# Patient Record
Sex: MALE | Race: WHITE | NOT HISPANIC OR LATINO | Employment: OTHER | URBAN - METROPOLITAN AREA
[De-identification: names, ages, dates, MRNs, and addresses within clinical notes are randomized per-mention and may not be internally consistent; named-entity substitution may affect disease eponyms.]

---

## 2017-06-02 ENCOUNTER — TELEPHONE (OUTPATIENT)
Dept: FAMILY MEDICINE | Facility: CLINIC | Age: 62
End: 2017-06-02

## 2017-06-02 LAB
BILIRUB SERPL-MCNC: 1.7 MG/DL (ref 0.3–1)
BILIRUBIN DIRECT+TOT PNL SERPL-MCNC: 0.2 MG/DL (ref 0–0.2)
GLUCOSE: 116 MG/DL (ref 70–99)
HIV 1/2 EIA AB SCREEN: NEGATIVE

## 2017-06-02 NOTE — TELEPHONE ENCOUNTER
----- Message from Sherlyn Cardenas MD sent at 6/2/2017 11:27 AM CDT -----  Notify pt : abnormal result, please make an appointment with me to review in two weeks or next appoitmebnt. Thanks    Sherlyn CARDENAS MD

## 2017-06-02 NOTE — PROGRESS NOTES
Notify pt : abnormal result, please make an appointment with me to review in two weeks or next appoitmebnt. Thanks    Sherlyn MANE MD

## 2017-06-05 NOTE — PROGRESS NOTES
Please notify pt : the result is acceptable.There are no significant abnormalities that are identified.  Please dont hesitate contact us if having any questions.  Or It will be reviewed in detail in the next visit.    Thanks . Sherlyn MANE MD

## 2017-06-28 RX ORDER — LISINOPRIL 10 MG/1
1 TABLET ORAL DAILY
Refills: 2 | COMMUNITY
Start: 2017-05-22 | End: 2017-06-29 | Stop reason: SDUPTHER

## 2017-06-29 ENCOUNTER — OFFICE VISIT (OUTPATIENT)
Dept: FAMILY MEDICINE | Facility: CLINIC | Age: 62
End: 2017-06-29
Payer: COMMERCIAL

## 2017-06-29 VITALS
OXYGEN SATURATION: 96 % | SYSTOLIC BLOOD PRESSURE: 124 MMHG | BODY MASS INDEX: 32.62 KG/M2 | HEART RATE: 87 BPM | DIASTOLIC BLOOD PRESSURE: 80 MMHG | HEIGHT: 71 IN | WEIGHT: 233 LBS

## 2017-06-29 DIAGNOSIS — I10 ESSENTIAL HYPERTENSION: ICD-10-CM

## 2017-06-29 DIAGNOSIS — E78.5 HYPERLIPIDEMIA, MILD: ICD-10-CM

## 2017-06-29 DIAGNOSIS — R73.03 PREDIABETES: Primary | ICD-10-CM

## 2017-06-29 DIAGNOSIS — R74.8 ELEVATED LIVER ENZYMES: ICD-10-CM

## 2017-06-29 DIAGNOSIS — K76.0 FATTY LIVER: ICD-10-CM

## 2017-06-29 PROCEDURE — 99203 OFFICE O/P NEW LOW 30 MIN: CPT | Mod: ,,, | Performed by: FAMILY MEDICINE

## 2017-06-29 RX ORDER — LISINOPRIL 10 MG/1
10 TABLET ORAL DAILY
Qty: 90 TABLET | Refills: 2 | Status: SHIPPED | OUTPATIENT
Start: 2017-06-29 | End: 2017-12-19 | Stop reason: SDUPTHER

## 2017-06-29 NOTE — PROGRESS NOTES
Subjective:       Patient ID:  Pierce Kim is a 61 y.o. male with multiple medical diagnoses as listed in the medical history and problem list that presents for Follow-up (labs) and Hypertension  .      Chief Complaint: Follow-up (labs) and Hypertension    Hypertension   This is a chronic problem. The current episode started more than 1 year ago. The problem is unchanged. The problem is controlled. Pertinent negatives include no anxiety, blurred vision, chest pain, headaches, malaise/fatigue, palpitations, peripheral edema, PND or shortness of breath. There are no associated agents to hypertension. Risk factors for coronary artery disease include dyslipidemia. Past treatments include ACE inhibitors. The current treatment provides mild improvement. There are no compliance problems.  There is no history of angina, CVA, heart failure or PVD. There is no history of chronic renal disease.     Review of Systems   Constitutional: Negative for activity change, appetite change, chills, fatigue, fever and malaise/fatigue.   HENT: Negative for congestion, ear discharge, ear pain, hearing loss and sore throat.    Eyes: Negative for blurred vision, discharge, redness and itching.   Respiratory: Negative for apnea, chest tightness, shortness of breath and wheezing.    Cardiovascular: Negative for chest pain, palpitations, leg swelling and PND.   Gastrointestinal: Negative for abdominal distention and abdominal pain.   Endocrine: Negative for cold intolerance and polydipsia.   Genitourinary: Negative for dysuria.   Musculoskeletal: Negative for arthralgias and gait problem.   Skin: Negative for color change, pallor and rash.   Neurological: Negative for dizziness and headaches.   Hematological: Negative for adenopathy.   Psychiatric/Behavioral: Negative for agitation.       Past Medical History:   Diagnosis Date    Hyperlipidemia     Hypertension      No past surgical history on file.  Family History   Problem Relation Age  of Onset    Hypertension Mother     Emphysema Father     Cancer Father      Prostate & Esophageal CA     Social History     Social History    Marital status:      Spouse name: N/A    Number of children: N/A    Years of education: N/A     Social History Main Topics    Smoking status: Never Smoker    Smokeless tobacco: Never Used    Alcohol use Yes      Comment: Moderate    Drug use: No    Sexual activity: Not Asked     Other Topics Concern    None     Social History Narrative    None     Current Outpatient Prescriptions   Medication Sig Dispense Refill    ASPIRIN (ASPIR-81 ORAL) Take 1 tablet by mouth Daily.      lisinopril 10 MG tablet Take 1 tablet (10 mg total) by mouth Daily. 90 tablet 2    multivitamin capsule Take 1 capsule by mouth once daily.       No current facility-administered medications for this visit.      Review of patient's allergies indicates:  No Known Allergies  Objective:      Vitals:    06/29/17 1002   BP: 124/80   Pulse: 87     Physical Exam   Constitutional: He appears well-developed and well-nourished.   HENT:   Head: Normocephalic.   Eyes: Pupils are equal, round, and reactive to light. Right eye exhibits no discharge. Left eye exhibits no discharge. No scleral icterus.   Neck: No thyromegaly present.   Cardiovascular: Normal rate and regular rhythm.  Exam reveals no gallop and no friction rub.    No murmur heard.  Pulmonary/Chest: No respiratory distress. He has no wheezes. He has no rales. He exhibits no tenderness.   Abdominal: He exhibits no distension. There is no tenderness.   Musculoskeletal: He exhibits no tenderness.   Skin: No erythema.   Psychiatric: He has a normal mood and affect.       Assessment:       1. Prediabetes    2. Fatty liver    3. Elevated liver enzymes    4. Hyperlipidemia, mild    5. Essential hypertension        Plan:       Prediabetes  Comments:  , 117 in 6/2017. Ha1c 5.4 ( false negative) 2/2 BUN high.  consider meftormin if FG > 110  in 6 months.     Fatty liver  Weight reduction, Restcit alcohol intake, maximize glucose control, lipid reduction, control blood pressure and supplements were reviewed with pt. Pt voices understanding.     Elevated liver enzymes  Comments:  4/2017: fatty liver.  ALt 34. Bilirubin 1.7.    6/2017: total bilirubin 1.6 m direct bilirubin 0.2, negative on HIV/ hep panel.     Hyperlipidemia, mild  Comments:  4/2017: total 196, HDL 45,  TG 88. no statin needed.     Essential hypertension  Comments:  on ACEI .   Orders:  -     lisinopril 10 MG tablet; Take 1 tablet (10 mg total) by mouth Daily.  Dispense: 90 tablet; Refill: 2      Return in about 6 months (around 12/29/2017) for HTN and pre-DM , HLD with .      6/29/2017 Sherlyn Cardenas M.D.

## 2017-06-29 NOTE — PATIENT INSTRUCTIONS
Diet: Diabetes  Food is an important tool that you can use to control diabetes and stay healthy. Eating well-balanced meals in the correct amounts will help you control your blood glucose levels and prevent low blood sugar reactions. It will also help you reduce the health risks of diabetes. There is no one specific diet that is right for everyone with diabetes. But there are general guidelines to follow. A registered dietitian (RD) will create a tailored diet approach thats just right for you. He or she will also help you plan healthy meals and snacks. If you have any questions, call your dietitian for advice.    Guidelines for success  Talk with your healthcare provider before starting a diabetes diet or weight loss program. If you haven't talked with a dietitian yet, ask your provider for a referral. The following guidelines can help you succeed:  · Select foods from the 6 food groups below. Your dietitian will help you find food choices within each group. He or she will also show you serving sizes and how many servings you can have at each meal.  ¨ Grains, beans, and starchy vegetables  ¨ Vegetables  ¨ Fruit  ¨ Milk or yogurt  ¨ Meat, poultry, fish, or tofu  ¨ Healthy fats  · Check your blood sugar levels as directed by your provider. Take any medicine as prescribed by your provider.  · Learn to read food labels and pick the right portion sizes.  · Eat only the amount of food in your meal plan. Eat about the same amount of food at regular times each day. Dont skip meals. Eat meals 4 to 5 hours apart, with snacks in between.  · Limit alcohol. It raises blood sugar levels. Drink water or calorie-free diet drinks that use safe sweeteners.  · Eat less fat to help lower your risk of heart disease. Use nonfat or low-fat dairy products and lean meats. Avoid fried foods. Use cooking oils that are unsaturated, such as olive, canola, or peanut oil.  · Talk with your dietitian about safe sugar substitutes.  · Avoid  added salt. It can contribute to high blood pressure, which can cause heart disease. People with diabetes already have a risk of high blood pressure and heart disease.  · Stay at a healthy weight. If you need to lose weight, cut down on your portion sizes. But dont skip meals. Exercise is an important part of any weight management program. Talk with your provider about an exercise program thats right for you.  · For more information about the best diet plan for you, talk with a registered dietitian (RD). To find an RD in your area, contact:  ¨ Academy of Nutrition and Dietetics www.eatright.org  ¨ The American Diabetes Association 853-515-9333 www.diabetes.org  Date Last Reviewed: 8/1/2016 © 2000-2016 [employer]. [address] [place] [address], [place] [address]. All rights reserved. This information is not intended as a substitute for professional medical care. Always follow your healthcare professional's instructions.

## 2017-12-19 ENCOUNTER — OFFICE VISIT (OUTPATIENT)
Dept: FAMILY MEDICINE | Facility: CLINIC | Age: 62
End: 2017-12-19
Payer: COMMERCIAL

## 2017-12-19 VITALS
HEART RATE: 85 BPM | SYSTOLIC BLOOD PRESSURE: 138 MMHG | DIASTOLIC BLOOD PRESSURE: 77 MMHG | BODY MASS INDEX: 33.32 KG/M2 | WEIGHT: 238 LBS | HEIGHT: 71 IN

## 2017-12-19 DIAGNOSIS — I10 BENIGN ESSENTIAL HTN: ICD-10-CM

## 2017-12-19 PROCEDURE — 99213 OFFICE O/P EST LOW 20 MIN: CPT | Mod: ,,, | Performed by: INTERNAL MEDICINE

## 2017-12-19 RX ORDER — LISINOPRIL 10 MG/1
10 TABLET ORAL DAILY
Qty: 90 TABLET | Refills: 2 | Status: SHIPPED | OUTPATIENT
Start: 2017-12-19 | End: 2018-09-11 | Stop reason: SDUPTHER

## 2017-12-19 NOTE — PROGRESS NOTES
Subjective:       Patient ID: Pierce Kim is a 62 y.o. male.    Chief Complaint: Hypertension    Hypertension   This is a new problem. The current episode started more than 1 month ago. Pertinent negatives include no anxiety, chest pain, malaise/fatigue, neck pain, orthopnea, palpitations or shortness of breath. There are no associated agents to hypertension. Past treatments include ACE inhibitors. There is no history of angina, kidney disease, CVA, heart failure, left ventricular hypertrophy or renovascular disease. There is no history of hypercortisolism, a hypertension causing med or sleep apnea.       Past Medical History:   Diagnosis Date    Allergy     Hyperlipidemia     Hypertension      Social History     Social History    Marital status:      Spouse name: N/A    Number of children: N/A    Years of education: N/A     Occupational History    Retired       [employer]     Social History Main Topics    Smoking status: Never Smoker    Smokeless tobacco: Never Used    Alcohol use Yes      Comment: Moderate    Drug use: No    Sexual activity: Not on file     Other Topics Concern    Not on file     Social History Narrative    No narrative on file     History reviewed. No pertinent surgical history.  Family History   Problem Relation Age of Onset    Hypertension Mother     Emphysema Father     Cancer Father      Prostate & Esophageal CA       Review of Systems   Constitutional: Negative for activity change, appetite change, fatigue, malaise/fatigue and unexpected weight change.   HENT: Negative for congestion, sneezing and trouble swallowing.    Eyes: Negative for pain and visual disturbance.   Respiratory: Negative for cough, chest tightness and shortness of breath.    Cardiovascular: Negative for chest pain, palpitations, orthopnea and leg swelling.        Hypertension   Gastrointestinal: Negative for abdominal distention, abdominal pain, blood in stool, constipation and  "diarrhea.   Endocrine: Negative for cold intolerance, heat intolerance, polydipsia, polyphagia and polyuria.   Genitourinary: Positive for frequency. Negative for dysuria, hematuria and scrotal swelling.        Occasional nocturia ×2 at nighttime   Musculoskeletal: Negative for arthralgias, back pain, gait problem and neck pain.   Skin: Negative for pallor, rash and wound.        Slightly dry skin in the inferior extremities   Allergic/Immunologic: Negative for environmental allergies, food allergies and immunocompromised state.   Neurological: Negative for dizziness, seizures, speech difficulty, light-headedness and numbness.   Hematological: Negative for adenopathy. Does not bruise/bleed easily.   Psychiatric/Behavioral: Negative for agitation, behavioral problems and confusion. The patient is not nervous/anxious.        Objective:       Vitals:    12/19/17 0918   BP: 138/77   Pulse: 85   Weight: 108 kg (238 lb)   Height: 5' 11" (1.803 m)     Physical Exam   Constitutional: He is oriented to person, place, and time. He appears well-developed.   HENT:   Head: Normocephalic and atraumatic.   Nose: Nose normal.   Mouth/Throat: Oropharynx is clear and moist. No oropharyngeal exudate.   Eyes: Conjunctivae and EOM are normal.   Neck: Normal range of motion. Neck supple. No JVD present. No tracheal deviation present. No thyromegaly present.   Cardiovascular: Normal rate, regular rhythm and normal heart sounds.  Exam reveals no gallop and no friction rub.    No murmur heard.  Pulmonary/Chest: Effort normal and breath sounds normal. No respiratory distress. He has no wheezes. He has no rales.   Abdominal: Soft. Bowel sounds are normal. He exhibits no distension. There is no tenderness.   Musculoskeletal: Normal range of motion.   Lymphadenopathy:     He has no cervical adenopathy.   Neurological: He is alert and oriented to person, place, and time. He has normal reflexes.   Skin: Skin is warm and dry.   Dry skin "   Psychiatric: He has a normal mood and affect.   Nursing note and vitals reviewed.      Assessment:       1. Benign essential HTN         Plan:           Benign essential HTN  -     lisinopril 10 MG tablet; Take 1 tablet (10 mg total) by mouth Daily.  Dispense: 90 tablet; Refill: 2    It has been a pleasure encouraged seeing Mr. Kim today for follow-up.    He has recently been diagnosed with blood pressure and it seems to be stable with lisinopril. On suddenly getting up, on few occasions he feels a little lightheaded which wears off rapidly and fast.      He is enjoying his life currently with his children in [facility]. He has moved from [facility].He Is a retired .    While his blood pressures are fairly stable, I did review his labs in past and his LDL cholesterol was slightly elevated. His blood sugars fasting were also slightly elevated.    He has put on some weight when he is settled in  [place] for the last couple of years. I have set  him a target for 225 pounds over the next 6 months.    This will definitely help mitigate some of the blood pressure naturally and will lower his blood glucose also. When he comes back for follow-up I'll check a lipid panel and blood sugar at that time.    Other preventive care issues will also be subsequently addressed including shingles vaccine.    Patient does not smoke cigarettes. He does consume couple of alcoholic drinks on occasions and on some occasions he may consume  upto 4. I will advise him to generally stay with the limits of 2 drinks on  most of the occasions except if it is a special occasion and [employer] win.

## 2017-12-19 NOTE — PATIENT INSTRUCTIONS
Controlling High Blood Pressure  High blood pressure (hypertension) is often called the silent killer. This is because many people who have it dont know it. High blood pressure is defined as 140/90 mm Hg or higher. Know your blood pressure and remember to check it regularly. Doing so can save your life. Here are some things you can do to help control your blood pressure.    Choose heart-healthy foods  · Select low-salt, low-fat foods. Limit sodium intake to 2,400 mg per day or the amount suggested by your healthcare provider.  · Limit canned, dried, cured, packaged, and fast foods. These can contain a lot of salt.  · Eat 8 to 10 servings of fruits and vegetables every day.  · Choose lean meats, fish, or chicken.  · Eat whole-grain pasta, brown rice, and beans.  · Eat 2 to 3 servings of low-fat or fat-free dairy products.  · Ask your doctor about the DASH eating plan. This plan helps reduce blood pressure.  · When you go to a restaurant, ask that your meal be prepared with no added salt.  Maintain a healthy weight  · Ask your healthcare provider how many calories to eat a day. Then stick to that number.  · Ask your healthcare provider what weight range is healthiest for you. If you are overweight, a weight loss of only 3% to 5% of your body weight can help lower blood pressure. Generally, a good weight loss goal is to lose 10% of your body weight in a year.  · Limit snacks and sweets.  · Get regular exercise.  Get up and get active  · Choose activities you enjoy. Find ones you can do with friends or family. This includes bicycling, dancing, walking, and jogging.  · Park farther away from building entrances.  · Use stairs instead of the elevator.  · When you can, walk or bike instead of driving.  · [place] leaves, garden, or do household repairs.  · Be active at a moderate to vigorous level of physical activity for at least 40 minutes for a minimum of 3 to 4 days a week.   Manage stress  · Make time to relax and enjoy  life. Find time to laugh.  · Communicate your concerns with your loved ones and your healthcare provider.  · Visit with family and friends, and keep up with hobbies.  Limit alcohol and quit smoking  · Men should have no more than 2 drinks per day.  · Women should have no more than 1 drink per day.  · Talk with your healthcare provider about quitting smoking. Smoking significantly increases your risk for heart disease and stroke. Ask your healthcare provider about community smoking cessation programs and other options.  Medicines  If lifestyle changes arent enough, your healthcare provider may prescribe high blood pressure medicine. Take all medicines as prescribed. If you have any questions about your medicines, ask your healthcare provider before stopping or changing them.   Date Last Reviewed: 4/27/2016  © 3515-2226 [employer]. [address] [place] [address], [place] [address]. All rights reserved. This information is not intended as a substitute for professional medical care. Always follow your healthcare professional's instructions.

## 2018-07-09 ENCOUNTER — OFFICE VISIT (OUTPATIENT)
Dept: FAMILY MEDICINE | Facility: CLINIC | Age: 63
End: 2018-07-09
Payer: MEDICARE

## 2018-07-09 VITALS
SYSTOLIC BLOOD PRESSURE: 120 MMHG | HEIGHT: 71 IN | BODY MASS INDEX: 33.18 KG/M2 | HEART RATE: 82 BPM | WEIGHT: 237 LBS | DIASTOLIC BLOOD PRESSURE: 64 MMHG

## 2018-07-09 DIAGNOSIS — R06.83 SNORING: ICD-10-CM

## 2018-07-09 DIAGNOSIS — I10 BENIGN ESSENTIAL HTN: Primary | ICD-10-CM

## 2018-07-09 DIAGNOSIS — R53.83 FATIGUE, UNSPECIFIED TYPE: ICD-10-CM

## 2018-07-09 PROCEDURE — 99214 OFFICE O/P EST MOD 30 MIN: CPT | Mod: ,,, | Performed by: INTERNAL MEDICINE

## 2018-07-09 PROCEDURE — 3074F SYST BP LT 130 MM HG: CPT | Mod: ,,, | Performed by: INTERNAL MEDICINE

## 2018-07-09 PROCEDURE — 3008F BODY MASS INDEX DOCD: CPT | Mod: ,,, | Performed by: INTERNAL MEDICINE

## 2018-07-09 PROCEDURE — 3078F DIAST BP <80 MM HG: CPT | Mod: ,,, | Performed by: INTERNAL MEDICINE

## 2018-07-09 NOTE — PATIENT INSTRUCTIONS
Identifying Your Heart Risks  What are your risk factors?  A risk factor increases your chance of having heart disease. Some risk factors cant be controlled, such as age or family history of heart disease. But most others can be managed by making lifestyle changes and taking medicine. For each risk factor you reduce, your chance of heart attack and stroke goes down. And, the length and quality of life may go up.  You can make changes to manage the following risk factors.  Abnormal cholesterol levels  Abnormal levels of cholesterol can increase your risk of developing atherosclerotic cardiovascular disease (ASCVD), which can lead to a heart, stroke, or other problems. If your cholesterol levels are of concern, your healthcare provider will work with you to improve your cholesterol level. Lifestyle changes such as diet, exercise, and weight management can help improve your cholesterol level, but you may also need medicine.    High blood pressure  High blood pressure (hypertension) occurs when blood pushes too hard against artery walls as it flows through them. This damages the artery lining. In general, youre at risk if you have:  · Blood pressure of 120/80 or higher. Your doctor may prescribe a personal goal.  · Blood pressure of 140/90 is high blood pressure.  Smoking  This is the most important risk factor you can change. Smoking damages arteries and makes it easier for plaque to build up. Smokers are also at higher risk for blood clots (which can block arteries) and stroke. Youre at risk if you use any kind of tobacco or nicotine. This means:  · Cigarettes  · E-cigarettes  · Chew tobacco  · Cigars  · Pipe  Diabetes  This health problem leads to a high level of sugar in your blood. It can damage the arteries if not kept under control. Diabetes makes you more likely to have a silent heart attack (one without symptoms). Youre at risk if:  · Your A1C is between 5.7 and 6.4. Once it reaches 6.5, you have  diabetes.  Your healthcare provider will help you figure out what your A1C should be. Your target number will depend on your age, general health, and other factors. Your treatment plan may need changes if your current number is too high.  Excess weight  Being overweight makes other risk factors, such as high blood pressure and diabetes, more likely. Excess weight around the waist or stomach increases your heart disease risk the most. Youre at risk if your:  · Waist circumference is more than 35 inches (women) or 40 inches (men).  · Body mass index (BMI) is greater than 25.  Lack of physical activity  If youre not active, problems with diabetes, blood pressure, cholesterol, and weight are more likely. Youre at risk if:  · You exercise less than 40 minutes per day, on fewer than 3 to 4 days a week.  Stress and strong emotions  Stressful events and feelings can raise heart rate and blood pressure. Stress can also bring on feelings of depression, anxiety, and anger. These feelings do not directly lead to heart disease, but they do affect overall health and make quality of life worse.  Date Last Reviewed: 7/1/2016  © 8267-2107 [employer]. [address] [place] [address]. All rights reserved. This information is not intended as a substitute for professional medical care. Always follow your healthcare professional's instructions.

## 2018-07-09 NOTE — PROGRESS NOTES
Subjective:       Patient ID: Pierce Kim is a 62 y.o. male.    Chief Complaint: Hypertension    Mr. Pierce Kim is a pleasant 62-year-old gentleman who comes for follow-up. His underlying hypertension. He is compliant with medications.    Generally his blood pressures under control.    He does admit to some snoring at night time and perhaps louder as per his wife. He does go to the bathroom couple of times at night. This does not seem to bother him overall.    He is a retired  and enjoying his postretirement life with his wife and family members.    No chest pains or shortness of breath. He did have a colonoscopy in 2017 and was found to have multiple polyps and has been recommended surveillance follow-up in 3-5 years.    No significant prostate symptoms.      Hypertension   This is a chronic problem. The current episode started more than 1 year ago. The problem is controlled. Pertinent negatives include no chest pain, malaise/fatigue, neck pain, palpitations or shortness of breath. There are no associated agents to hypertension. Risk factors for coronary artery disease include male gender and obesity. Past treatments include ACE inhibitors. There is no history of hyperaldosteronism, hypercortisolism, pheochromocytoma, renovascular disease or sleep apnea (snoring +).   Fatigue   This is a new problem. The current episode started more than 1 year ago. The problem occurs intermittently. The problem has been waxing and waning. Associated symptoms include fatigue. Pertinent negatives include no abdominal pain, arthralgias, chest pain, congestion, coughing, neck pain, numbness or rash.       Past Medical History:   Diagnosis Date    Allergy     Hyperlipidemia     Hypertension      Social History     Social History    Marital status:      Spouse name: N/A    Number of children: 3    Years of education: N/A     Occupational History    Retired [employer]  History Main Topics    Smoking status: Never Smoker    Smokeless tobacco: Never Used    Alcohol use Yes      Comment: Moderate    Drug use: No    Sexual activity: Yes     Partners: Female     Other Topics Concern    Not on file     Social History Narrative    No narrative on file     Past Surgical History:   Procedure Laterality Date    COLONOSCOPY  2017     Family History   Problem Relation Age of Onset    Hypertension Mother     Emphysema Father     Cancer Father         Prostate & Esophageal CA       Review of Systems   Constitutional: Positive for fatigue. Negative for activity change, appetite change, malaise/fatigue and unexpected weight change.   HENT: Negative for congestion, sneezing and trouble swallowing.    Eyes: Negative for pain and visual disturbance.   Respiratory: Negative for cough, chest tightness and shortness of breath.         Snoring but no formal diagnosis of sleep apnea.   Cardiovascular: Negative for chest pain, palpitations and leg swelling.        Hypertension   Gastrointestinal: Negative for abdominal distention, abdominal pain, blood in stool, constipation and diarrhea.   Endocrine: Negative for cold intolerance, heat intolerance, polydipsia, polyphagia and polyuria.   Genitourinary: Positive for frequency. Negative for dysuria, hematuria and scrotal swelling.        Occasional nocturia ×2 at nighttime   Musculoskeletal: Negative for arthralgias, back pain, gait problem and neck pain.   Skin: Negative for pallor, rash and wound.        Slightly dry skin in the inferior extremities   Allergic/Immunologic: Negative for environmental allergies, food allergies and immunocompromised state.   Neurological: Negative for dizziness, seizures, speech difficulty, light-headedness and numbness.   Hematological: Negative for adenopathy. Does not bruise/bleed easily.   Psychiatric/Behavioral: Negative for agitation, behavioral problems and confusion. The patient is not nervous/anxious.      "   Objective:       Vitals:    07/09/18 1431   BP: 120/64   Pulse: 82   Weight: 107.5 kg (237 lb)   Height: 5' 11" (1.803 m)     Physical Exam   Constitutional: He is oriented to person, place, and time. He appears well-developed.   HENT:   Head: Normocephalic and atraumatic.   Nose: Nose normal.   Mouth/Throat: Oropharynx is clear and moist. No oropharyngeal exudate.   Oral airways are fairly patent.   Eyes: Conjunctivae and EOM are normal.   Neck: Normal range of motion. Neck supple. No JVD present. No tracheal deviation present. No thyromegaly present.   Cardiovascular: Normal rate, regular rhythm and normal heart sounds.  Exam reveals no gallop and no friction rub.    No murmur heard.  Pulmonary/Chest: Effort normal and breath sounds normal. No respiratory distress. He has no wheezes. He has no rales.   Abdominal: Soft. Bowel sounds are normal. He exhibits no distension. There is no tenderness.   Musculoskeletal: Normal range of motion.   Lymphadenopathy:     He has no cervical adenopathy.   Neurological: He is alert and oriented to person, place, and time. He has normal reflexes.   Skin: Skin is warm and dry.   Dry skin   Psychiatric: He has a normal mood and affect.   Nursing note and vitals reviewed.      Component      Latest Ref Rng & Units 6/2/2017   Glucose      70 - 99 mg/dL 116 (H)   Total Bilirubin      0.3 - 1.0 mg/dL 1.7 (H)   Bilirubin, Direct      0.0 - 0.2 mg/dL 0.2   HIV 1/2 EIA Ab Screen      NEGATIVE NEGATIVE     Assessment:       1. Benign essential HTN    2. Snoring    3. Fatigue, unspecified type         Plan:           Benign essential HTN  -     Comprehensive metabolic panel; Future; Expected date: 07/09/2018  -     Microalbumin/creatinine urine ratio; Future; Expected date: 07/09/2018  -     Lipid panel; Future; Expected date: 07/09/2018    Snoring    Fatigue, unspecified type  -     Testosterone; Future; Expected date: 07/09/2018    Patient has been advised to watch diet and exercise. " Avoid fried and fatty food. Compliance to medications and follow up urged.    The patient is asked to make an attempt to improve diet and exercise patterns to aid in medical management of this problem.    Alcohol in moderation.

## 2018-09-11 DIAGNOSIS — I10 BENIGN ESSENTIAL HTN: ICD-10-CM

## 2018-09-11 RX ORDER — LISINOPRIL 10 MG/1
10 TABLET ORAL DAILY
Qty: 90 TABLET | Refills: 2 | Status: SHIPPED | OUTPATIENT
Start: 2018-09-11 | End: 2019-06-11 | Stop reason: SDUPTHER

## 2018-12-18 LAB
ALBUMIN SERPL-MCNC: 4.3 G/DL (ref 3.1–4.7)
ALP SERPL-CCNC: 43 IU/L (ref 40–104)
ALT (SGPT): 30 IU/L (ref 3–33)
AST SERPL-CCNC: 27 IU/L (ref 10–40)
BILIRUB SERPL-MCNC: 1.4 MG/DL (ref 0.3–1)
BUN SERPL-MCNC: 25 MG/DL (ref 8–20)
CALCIUM SERPL-MCNC: 9 MG/DL (ref 7.7–10.4)
CHLORIDE: 100 MMOL/L (ref 98–110)
CO2 SERPL-SCNC: 27.6 MMOL/L (ref 22.8–31.6)
CREATININE RANDOM URINE: 183 MG/DL
CREATININE: 1 MG/DL (ref 0.6–1.4)
GLUCOSE: 131 MG/DL (ref 70–99)
MICROALBUM.,U,RANDOM: 7.8 MCG/ML (ref 0–19.9)
MICROALBUMIN/CREATININE RATIO: 4 (ref 0–30)
POTASSIUM SERPL-SCNC: 4.1 MMOL/L (ref 3.5–5)
PROT SERPL-MCNC: 7.3 G/DL (ref 6–8.2)
SODIUM: 137 MMOL/L (ref 134–144)

## 2019-01-09 ENCOUNTER — OFFICE VISIT (OUTPATIENT)
Dept: FAMILY MEDICINE | Facility: CLINIC | Age: 64
End: 2019-01-09
Payer: MEDICARE

## 2019-01-09 VITALS
HEART RATE: 71 BPM | BODY MASS INDEX: 34.72 KG/M2 | SYSTOLIC BLOOD PRESSURE: 121 MMHG | HEIGHT: 71 IN | WEIGHT: 248 LBS | DIASTOLIC BLOOD PRESSURE: 87 MMHG

## 2019-01-09 DIAGNOSIS — E80.4 GILBERT SYNDROME: ICD-10-CM

## 2019-01-09 DIAGNOSIS — I10 BENIGN ESSENTIAL HTN: Primary | ICD-10-CM

## 2019-01-09 DIAGNOSIS — R73.03 PREDIABETES: ICD-10-CM

## 2019-01-09 PROCEDURE — 3008F BODY MASS INDEX DOCD: CPT | Mod: ,,, | Performed by: INTERNAL MEDICINE

## 2019-01-09 PROCEDURE — 99213 OFFICE O/P EST LOW 20 MIN: CPT | Mod: ,,, | Performed by: INTERNAL MEDICINE

## 2019-01-09 PROCEDURE — 3074F PR MOST RECENT SYSTOLIC BLOOD PRESSURE < 130 MM HG: ICD-10-PCS | Mod: ,,, | Performed by: INTERNAL MEDICINE

## 2019-01-09 PROCEDURE — 99213 PR OFFICE/OUTPT VISIT, EST, LEVL III, 20-29 MIN: ICD-10-PCS | Mod: ,,, | Performed by: INTERNAL MEDICINE

## 2019-01-09 PROCEDURE — 3074F SYST BP LT 130 MM HG: CPT | Mod: ,,, | Performed by: INTERNAL MEDICINE

## 2019-01-09 PROCEDURE — 3079F DIAST BP 80-89 MM HG: CPT | Mod: ,,, | Performed by: INTERNAL MEDICINE

## 2019-01-09 PROCEDURE — 3008F PR BODY MASS INDEX (BMI) DOCUMENTED: ICD-10-PCS | Mod: ,,, | Performed by: INTERNAL MEDICINE

## 2019-01-09 PROCEDURE — 3079F PR MOST RECENT DIASTOLIC BLOOD PRESSURE 80-89 MM HG: ICD-10-PCS | Mod: ,,, | Performed by: INTERNAL MEDICINE

## 2019-01-09 NOTE — PROGRESS NOTES
Subjective:       Patient ID: Pierce Kim is a 63 y.o. male.    Chief Complaint: Hypertension (lab review )    Hypertension   This is a chronic problem. The current episode started more than 1 year ago. The problem is controlled. Pertinent negatives include no chest pain, neck pain, palpitations or shortness of breath. Past treatments include ACE inhibitors. The current treatment provides moderate improvement. There is no history of hyperaldosteronism, hypercortisolism, pheochromocytoma or renovascular disease.       Past Medical History:   Diagnosis Date    Allergy     Hyperlipidemia     Hypertension      Social History     Socioeconomic History    Marital status:      Spouse name: Zoila    Number of children: 3    Years of education: Not on file    Highest education level: Not on file   Social Needs    Financial resource strain: Not on file    Food insecurity - worry: Not on file    Food insecurity - inability: Not on file    Transportation needs - medical: Not on file    Transportation needs - non-medical: Not on file   Occupational History    Occupation: Retired      Comment: [employer]   Tobacco Use    Smoking status: Never Smoker    Smokeless tobacco: Never Used   Substance and Sexual Activity    Alcohol use: Yes     Comment: Moderate    Drug use: No    Sexual activity: Yes     Partners: Female   Other Topics Concern    Not on file   Social History Narrative    Not on file     Past Surgical History:   Procedure Laterality Date    COLONOSCOPY  2017     Family History   Problem Relation Age of Onset    Hypertension Mother     Dementia Mother     Emphysema Father     Cancer Father         Prostate & Esophageal CA    Hypertension Sister        Review of Systems   Constitutional: Negative for activity change, appetite change, fatigue and unexpected weight change (gained 11 lbs).   HENT: Negative for congestion, sneezing and trouble swallowing.    Eyes: Negative  "for pain and visual disturbance.   Respiratory: Negative for cough, chest tightness and shortness of breath.         Snoring but no formal diagnosis of sleep apnea.   Cardiovascular: Negative for chest pain, palpitations and leg swelling.        Hypertension   Gastrointestinal: Negative for abdominal distention, abdominal pain, blood in stool, constipation and diarrhea.   Endocrine: Negative for cold intolerance, heat intolerance, polydipsia, polyphagia and polyuria.   Genitourinary: Negative for dysuria, frequency, hematuria and scrotal swelling.        Occasional nocturia ×2 at nighttime   Musculoskeletal: Negative for arthralgias, back pain, gait problem and neck pain.   Skin: Negative for pallor, rash and wound.        Slightly dry skin in the inferior extremities   Allergic/Immunologic: Negative for environmental allergies, food allergies and immunocompromised state.   Neurological: Negative for dizziness, seizures, speech difficulty, light-headedness and numbness.   Hematological: Negative for adenopathy. Does not bruise/bleed easily.   Psychiatric/Behavioral: Positive for sleep disturbance. Negative for agitation, behavioral problems and confusion. The patient is not nervous/anxious.          Objective:      Blood pressure 121/87, pulse 71, height 5' 11" (1.803 m), weight 112.5 kg (248 lb). Body mass index is 34.59 kg/m².  Physical Exam   Constitutional: He is oriented to person, place, and time. He appears well-developed.   HENT:   Head: Normocephalic and atraumatic.   Nose: Nose normal.   Mouth/Throat: Oropharynx is clear and moist. No oropharyngeal exudate.   Oral airways are fairly patent.   Eyes: Conjunctivae and EOM are normal.   Neck: Normal range of motion. Neck supple. No JVD present. No tracheal deviation present. No thyromegaly present.   Cardiovascular: Normal rate, regular rhythm and normal heart sounds. Exam reveals no gallop and no friction rub.   No murmur heard.  Pulmonary/Chest: Effort " normal and breath sounds normal. No respiratory distress. He has no wheezes. He has no rales.   Abdominal: Soft. Bowel sounds are normal. He exhibits no distension. There is no tenderness.   Musculoskeletal: Normal range of motion.   Lymphadenopathy:     He has no cervical adenopathy.   Neurological: He is alert and oriented to person, place, and time. He has normal reflexes.   Skin: Skin is warm and dry.   Dry skin   Psychiatric: He has a normal mood and affect.   Nursing note and vitals reviewed.        Assessment:       1. Benign essential HTN    2. Prediabetes    3. Gilbert syndrome           Orders Only on 12/18/2018   Component Date Value Ref Range Status    Microalbum.,U,Random 12/18/2018 7.8  0.0 - 19.9 mcg/ml Final    Creatinine, Random Ur 12/18/2018 183.00  mg/dl Final    Microalb Creat Ratio 12/18/2018 4  0 - 30 Final   Orders Only on 12/18/2018   Component Date Value Ref Range Status    Glucose 12/18/2018 131* 70 - 99 mg/dL Final    BUN, Bld 12/18/2018 25* 8 - 20 mg/dL Final    Creatinine 12/18/2018 1.00  0.60 - 1.40 mg/dL Final    Calcium 12/18/2018 9.0  7.7 - 10.4 mg/dL Final    Sodium 12/18/2018 137  134 - 144 mmol/L Final    Potassium 12/18/2018 4.1  3.5 - 5.0 mmol/L Final    Chloride 12/18/2018 100  98 - 110 mmol/L Final    CO2 12/18/2018 27.6  22.8 - 31.6 mmol/L Final    Albumin 12/18/2018 4.3  3.1 - 4.7 g/dL Final    Total Bilirubin 12/18/2018 1.4* 0.3 - 1.0 mg/dL Final    Alkaline Phosphatase 12/18/2018 43  40 - 104 IU/L Final    Total Protein 12/18/2018 7.3  6.0 - 8.2 g/dL Final    ALT (SGPT) 12/18/2018 30  3 - 33 IU/L Final    AST 12/18/2018 27  10 - 40 IU/L Final         Plan:           Benign essential HTN    Prediabetes    Gilbert syndrome       blood pressures are doing fairly good. Since the last visit, and over the holidays he is getting approximately 11 pounds of weight. His undoing seems to be the intermittent snacking besides the regular meals that he  has.    I did have dietary discussion especially given his slightly elevated blood sugars of 131 and slightly elevated LDL cholesterol.    Come this spring and summer season I would like him to lose about 10-15 pounds of weight before the next 6 months visit. At that time I will check his blood sugar and hemoglobin A1c again. See how he does. In the meantime he'll continue with lisinopril and aspirin.          Current Outpatient Medications:     ASPIRIN (ASPIR-81 ORAL), Take 1 tablet by mouth Daily., Disp: , Rfl:     lisinopril 10 MG tablet, Take 1 tablet (10 mg total) by mouth Daily., Disp: 90 tablet, Rfl: 2    multivitamin capsule, Take 1 capsule by mouth once daily., Disp: , Rfl:

## 2019-01-09 NOTE — PATIENT INSTRUCTIONS
Diabetes and Heart Disease     Take your medicines as directed each day, even if you feel fine.   If you have diabetes, you are two to four times more likely to have heart disease than someone without diabetes. This higher risk is due to diabetes, but it is also due to other risk factors for heart disease that happen in people with diabetes. But theres good news. You can help control your health risks by making some changes in your life. You can take steps to reduce your risk of heart disease by half--similar to the risk in people who don't have diabetes.  Your main risk factors  Three major risk factors for heart disease are high blood sugar, high blood pressure, and high levels of lipids. By keeping risk factors under control, you can help keep your heart and arteries healthy. This may reduce your chances of a heart attack.  · Blood sugar. High blood sugar can make artery walls tough and rough. Plaque (waxy material in the blood) can then build up along the artery walls, making it harder for blood to flow through the arteries. Having high blood sugar increases the chances of having high blood pressure and high cholesterol.  · Blood pressure. When blood pressure is high all the time it causes your heart to work harder to pump blood. Artery walls become damaged. This increases the risk for plaque build up.  · Lipids. The body needs some lipids in the blood to stay healthy. But lipid levels that are too high can damage the artery walls. Lipids include cholesterol and triglycerides. There are two kinds of cholesterol. LDL (bad) cholesterol can damage the arteries. But HDL (good) cholesterol helps clear LDL cholesterol from the blood vessels. This helps keep the arteries healthy. When blood sugar is high, the level of triglycerides in the blood may also be high. High blood triglyceride levels can cause plaque to form.   Other risk factors  Certain lifestyle factors can increase levels of your blood sugar, blood  pressure, and lipids. Such increases raise your risk of heart disease:  · Smoking damages the lining of your arteries. This allows plaque to build up in the artery walls. Smoking also constricts (narrows) the arteries. This can raise blood pressure and cause chest pain or angina. Smoking also increases your risk of getting type 2 diabetes.  · Not being active makes it harder for your heart to do its work. Inactivity is linked to many other risk factors, such as high blood pressure and poor cholesterol levels. Inactivity also increases your risk of getting type 2 diabetes.  · Being overweight makes it harder for your body to use insulin. It also makes your heart work too hard. Being overweight is also the main contributor to the development of type 2 diabetes,   Changes you can make  Following a few simple steps can help keep your risk factors under control. Work with your healthcare team to reach your goals.  · Quitting smoking could save your life. Smoking damages the lining of the blood vessels and raises blood pressure. Smoking also affects how your body uses insulin. This makes it harder to keep blood sugar under control. If you smoke and need help quitting, talk to your healthcare team.   · Testing your blood sugar is the only way to know whether it is under control. Be sure to test your blood sugar yourself. Also get your blood tested in the lab, as directed.  · Monitoring your blood pressure and lipid levels can help you achieve safe levels. Visit your healthcare team as scheduled.  · Taking medicines as directed can help control blood sugar, blood pressure, blood clotting, and/or cholesterol levels.  · Eating right can reduce your risk factors and help you lose weight. Try to limit the amount of processed or refined carbohydrates you eat at one time. Cut back on your total calorie intake. Eat foods low in saturated fat and cholesterol. Eat fiber, including vegetables and whole grains, and cut down on salt. A  dietitian or diabetes educator can help form a meal plan that works for you--even if you are on a low budget.   · Being active can help reduce your weight, strengthen your heart, and lower your lipid levels and blood pressure. Exercise and activity are good for your whole body. Talk to your healthcare team about increasing your activity safely over time.  · Keeping your appointments with your healthcare provider helps you stay healthy. Go in for checkups and lab tests as scheduled.  Date Last Reviewed: 5/19/2016 © 2000-2017 [employer]. [address] [place] [address], [place] [address]. All rights reserved. This information is not intended as a substitute for professional medical care. Always follow your healthcare professional's instructions.        Aerobic Exercise for a Healthy Heart  Exercise is a lot more than an energy booster and a stress reliever. It also strengthens your heart muscle, lowers your blood pressure and cholesterol, and burns calories. It can also improve your resting muscle tone, and your mood.     Remember, some activity is better than none.    Choose an aerobic activity  Choose an activity that makes your heart and lungs work harder than they do when you rest or walk normally. This aerobic exercise can improve the way your heart and other muscles use oxygen. Make it fun by exercising with a friend and choosing an activity you enjoy. Here are some ideas:  · Walking  · Swimming  · Bicycling  · Stair climbing  · Dancing  · Jogging  · Gardening  Exercise regularly  If you havent been exercising regularly,  get your doctors OK first. Then start slowly.  Here are some tips:  · Begin exercising 3 times a week for 5 to 10 minutes at a time.  · When you feel comfortable, add a few minutes each session.  · Slowly build up to exercising 3 to 4 times each week. Each session should last for 40 minutes, on average, and involve moderate- to vigorous-intensity physical activity.  · If you have been  given nitroglycerin, be sure to carry it when you exercise.  · If you get chest pain (angina) when youre exercising, stop what youre doing, take your nitroglycerin, and call your doctor.  Date Last Reviewed: 6/2/2016 © 2000-2017 [employer]. [address] [place] [address]. All rights reserved. This information is not intended as a substitute for professional medical care. Always follow your healthcare professional's instructions.        Established High Blood Pressure    High blood pressure (hypertension) is a chronic disease. Often, healthcare providers dont know what causes it. But it can be caused by certain health conditions and medicines.  If you have high blood pressure, you may not have any symptoms. If you do have symptoms, they may include headache, dizziness, changes in your vision, chest pain, and shortness of breath. But even without symptoms, high blood pressure thats not treated raises your risk for heart attack and stroke. High blood pressure is a serious health risk and shouldnt be ignored.  A blood pressure reading is made up of two numbers: a higher number over a lower number. The top number is the systolic pressure. The bottom number is the diastolic pressure. A normal blood pressure is a systolic pressure of  less than 120 over a diastolic pressure of less than 80. You will see your blood pressure readings written together. For example, a person with a systolic pressure of 188 and a diastolic pressure of 78 will have 118/78 written in the medical record.  High blood pressure is when either the top number is 140 or higher, or the bottom number is 90 or higher. This must be the result when taking your blood pressure a number of times. The blood pressures between normal and high are called prehypertension.  Home care  If you have high blood pressure, you should do what is listed below to lower your blood pressure. If you are taking medicines for high blood pressure,  these methods may reduce or end your need for medicines in the future.  · Begin a weight-loss program if you are overweight.  · Cut back on how much salt you get in your diet. Heres how to do this:  ¨ Dont eat foods that have a lot of salt. These include olives, pickles, smoked meats, and salted potato chips.  ¨ Dont add salt to your food at the table.  ¨ Use only small amounts of salt when cooking.  · Start an exercise program. Talk with your healthcare provider about the type of exercise program that would be best for you. It doesn't have to be hard. Even brisk walking for 20 minutes 3 times a week is a good form of exercise.  · Dont take medicines that stimulate the heart. This includes many over-the-counter cold and sinus decongestant pills and sprays, as well as diet pills. Check the warnings about hypertension on the label. Before buying any over-the-counter medicines or supplements, always ask the pharmacist about the product's potential interaction with your high blood pressure and your high blood pressure medicines.  · Stimulants such as amphetamine or cocaine could be deadly for someone with high blood pressure. Never take these.  · Limit how much caffeine you get in your diet. Switch to caffeine-free products.  · Stop smoking. If you are a long-time smoker, this can be hard. Talk to your healthcare provider about medicines and nicotine replacement options to help you. Also, enroll in a stop-smoking program to make it more likely that you will quit for good.  · Learn how to handle stress. This is an important part of any program to lower blood pressure. Learn about relaxation methods like meditation, yoga, or biofeedback.  · If your provider prescribed medicines, take them exactly as directed. Missing doses may cause your blood pressure get out of control.  · If you miss a dose or doses, check with your healthcare provider or pharmacist about what to do.  · Consider buying an automatic blood pressure  machine. Ask your provider for a recommendation. You can get one of these at most pharmacies.     The American Heart Association recommends the following guidelines for home blood pressure monitoring:  · Don't smoke or drink coffee for 30 minutes before taking your blood pressure.  · Go to the bathroom before the test.  · Relax for 5 minutes before taking the measurement.  · Sit with your back supported (don't sit on a couch or soft chair); keep your feet on the floor uncrossed. Place your arm on a solid flat surface (like a table) with the upper part of the arm at heart level. Place the middle of the cuff directly above the eye of the elbow. Check the monitor's instruction manual for an illustration.  · Take multiple readings. When you measure, take 2 to 3 readings one minute apart and record all of the results.  · Take your blood pressure at the same time every day, or as your healthcare provider recommends.  · Record the date, time, and blood pressure reading.  · Take the record with you to your next medical appointment. If your blood pressure monitor has a built-in memory, simply take the monitor with you to your next appointment.  · Call your provider if you have several high readings. Don't be frightened by a single high blood pressure reading, but if you get several high readings, check in with your healthcare provider.  · Note: When blood pressure reaches a systolic (top number) of 180 or higher OR diastolic (bottom number) of 110 or higher, seek emergency medical treatment.  Follow-up care  You will need to see your healthcare provider regularly. This is to check your blood pressure and to make changes to your medicines. Make a follow-up appointment as directed. Bring the record of your home blood pressure readings to the appointment.  When to seek medical advice  Call your healthcare provider right away if any of these occur:  · Blood pressure reaches a systolic (upper number) of 180 or higher OR a  diastolic (bottom number) of 110 or higher  · Chest pain or shortness of breath  · Severe headache  · Throbbing or rushing sound in the ears  · Nosebleed  · Sudden severe pain in your belly (abdomen)  · Extreme drowsiness, confusion, or fainting  · Dizziness or spinning sensation (vertigo)  · Weakness of an arm or leg or one side of the face  · You have problems speaking or seeing   Date Last Reviewed: 12/1/2016 © 2000-2017 [employer]. [address] [place] [address]. All rights reserved. This information is not intended as a substitute for professional medical care. Always follow your healthcare professional's instructions.        Eating Heart-Healthy Foods  Eating has a big impact on your heart health. In fact, eating healthier can improve several of your heart risks at once. For instance, it helps you manage weight, cholesterol, and blood pressure. Here are ideas to help you make heart-healthy changes without giving up all the foods and flavors you love.  Getting started  · Talk with your health care provider about eating plans, such as the DASH or Mediterranean diet. You may also be referred to a dietitian.  · Change a few things at a time. Give yourself time to get used to a few eating changes before adding more.  · Work to create a tasty, healthy eating plan that you can stick to for the rest of your life.    Goals for healthy eating  Below are some tips to improve your eating habits:  · Limit saturated fats and trans fats. Saturated fats raise your levels of cholesterol, so keep these fats to a minimum. They are found in foods such as fatty meats, whole milk, cheese, and palm and coconut oils. Avoid trans fats because they lower good cholesterol as well as raise bad cholesterol. Trans fats are most often found in processed foods.  · Reduce sodium (salt) intake. Eating too much salt may increase your blood pressure. Limit your sodium intake to 2,300 milligrams (mg) per day, or less if  your health care provider recommends it. Dining out less often and eating fewer processed foods are two great ways to decrease the amount of salt you consume.  · Managing calories. A calorie is a unit of energy. Your body burns calories for fuel, but if you eat more calories than your body burns, the extras are stored as fat. Your health care provider can help you create a diet plan to manage your calories. This will likely include eating healthier foods as well as exercising regularly. To help you track your progress, keep a diary to record what you eat and how often you exercise.  Choose the right foods  Aim to make these foods staples of your diet. If you have diabetes, you may have different recommendations than what is listed here:  · Fruits and vegetable provide plenty of nutrients without a lot of calories. At meals, fill half your plate with these foods. Split the other half of your plate between whole grains and lean protein.  · Whole grains are high in fiber and rich in vitamins and nutrients. Good choices include whole-wheat bread, pasta, and brown rice.  · Lean proteins give you nutrition with less fat. Good choices include fish, skinless chicken, and beans.  · Low-fat or nonfat dairy provides nutrients without a lot of fat. Try low-fat or nonfat milk, cheese, or yogurt.  · Healthy fats can be good for you in small amounts. These are unsaturated fats, such as olive oil, nuts, and fish. Try to have at least 2 servings per week of fatty fish such as salmon, sardines, mackerel, rainbow trout, and albacore tuna. These contain omega-3 fatty acids, which are good for your heart. Flaxseed is another source of a heart-healthy fat.  More on heart healthy eating    Read food labels  Healthy eating starts at the grocery store. Be sure to pay attention to food labels on packaged foods. Look for products that are high in fiber and protein, and low in saturated fat, cholesterol, and sodium. Avoid products that contain  trans fat. And pay close attention to serving size. For instance, if you plan to eat two servings, double all the numbers on the label.  Prepare food right  A key part of healthy cooking is cutting down on added fat and salt. Look on the internet for lower-fat, lower-sodium recipes. Also, try these tips:  · Remove fat from meat and skin from poultry before cooking.  · Skim fat from the surface of soups and sauces.  · Broil, boil, bake, steam, grill, and microwave food without added fats.  · Choose ingredients that spice up your food without adding calories, fat, or sodium. Try these items: horseradish, hot sauce, lemon, mustard, nonfat salad dressings, and vinegar. For salt-free herbs and spices, try basil, cilantro, cinnamon, pepper, and rosemary.  Date Last Reviewed: 6/25/2015  © 8574-6969 [employer]. [address] [place] [address]. All rights reserved. This information is not intended as a substitute for professional medical care. Always follow your healthcare professional's instructions.        Exercise for a Healthier Heart  You may wonder how you can improve the health of your heart. If youre thinking about exercise, youre on the right track. You dont need to become an athlete, but you do need a certain amount of brisk exercise to help strengthen your heart. If you have been diagnosed with a heart condition, your doctor may recommend exercise to help stabilize your condition. To help make exercise a habit, choose safe, fun activities.     Exercise with a friend. When activity is fun, you're more likely to stick with it.     Be sure to check with your healthcare provider before starting an exercise program.   Why exercise?  Exercising regularly offers many healthy rewards. It can help you do all of the following:  · Improve your blood cholesterol level to help prevent further heart trouble  · Lower your blood pressure to help prevent a stroke or heart attack  · Control diabetes, or  reduce your risk of getting this disease  · Improve your heart and lung function  · Reach and maintain a healthy weight  · Make your muscles stronger and more limber so you can stay active  · Prevent falls and fractures by slowing the loss of bone mass (osteoporosis)  · Manage stress better  · Reduce your blood pressure  · Improve your sense of self and your body image  Exercise tips  Ease into your routine. Set small goals. Then build on them.  Exercise on most days. Aim for a total of 150 or more minutes of moderate to  vigorous intensity activity each week. Consider 40 minutes, 3 to 4 times a week. For best results, activity should last for 40 minutes on average. It is OK to work up to the 40 minute period over time. Examples of moderate-intensity activity is walking 1 mile in 15 minutes or 30 to 45 minutes of yard work.  Step up your daily activity level. Along with your exercise program, try being more active throughout the day. Walk instead of drive. Do more household tasks or yard work.  Choose one or more activities you enjoy. Walking is one of the easiest things you can do. You can also try swimming, riding a bike, dancing, or taking an exercise class.  Stop exercising and call your doctor if you:  · Have chest pain or feel dizzy or lightheaded  · Feel burning, tightness, pressure, or heaviness in your chest, neck, shoulders, back, or arms  · Have unusual shortness of breath  · Have increased joint or muscle pain  · Have palpitations or an irregular heartbeat   Date Last Reviewed: 5/1/2016  © 1968-2890 [employer]. [address] [place] [address], [place] [address]. All rights reserved. This information is not intended as a substitute for professional medical care. Always follow your healthcare professional's instructions.        Aerobic Exercise for a Healthy Heart  Exercise is a lot more than an energy booster and a stress reliever. It also strengthens your heart muscle, lowers your blood pressure  and cholesterol, and burns calories. It can also improve your resting muscle tone, and your mood.     Remember, some activity is better than none.    Choose an aerobic activity  Choose an activity that makes your heart and lungs work harder than they do when you rest or walk normally. This aerobic exercise can improve the way your heart and other muscles use oxygen. Make it fun by exercising with a friend and choosing an activity you enjoy. Here are some ideas:  · Walking  · Swimming  · Bicycling  · Stair climbing  · Dancing  · Jogging  · Gardening  Exercise regularly  If you havent been exercising regularly,  get your doctors OK first. Then start slowly.  Here are some tips:  · Begin exercising 3 times a week for 5 to 10 minutes at a time.  · When you feel comfortable, add a few minutes each session.  · Slowly build up to exercising 3 to 4 times each week. Each session should last for 40 minutes, on average, and involve moderate- to vigorous-intensity physical activity.  · If you have been given nitroglycerin, be sure to carry it when you exercise.  · If you get chest pain (angina) when youre exercising, stop what youre doing, take your nitroglycerin, and call your doctor.  Date Last Reviewed: 6/2/2016  © 4716-8799 [employer]. [address] [place] [address], [place] [address]. All rights reserved. This information is not intended as a substitute for professional medical care. Always follow your healthcare professional's instructions.

## 2019-06-11 DIAGNOSIS — I10 BENIGN ESSENTIAL HTN: ICD-10-CM

## 2019-06-11 RX ORDER — LISINOPRIL 10 MG/1
TABLET ORAL
Qty: 30 TABLET | Refills: 1 | Status: SHIPPED | OUTPATIENT
Start: 2019-06-11 | End: 2019-08-16 | Stop reason: SDUPTHER

## 2019-07-12 LAB
BUN SERPL-MCNC: 34 MG/DL (ref 8–20)
CALCIUM SERPL-MCNC: 9.2 MG/DL (ref 7.7–10.4)
CHLORIDE: 104 MMOL/L (ref 98–110)
CO2 SERPL-SCNC: 27.1 MMOL/L (ref 22.8–31.6)
CREATININE: 1.04 MG/DL (ref 0.6–1.4)
GLUCOSE: 113 MG/DL (ref 70–99)
HBA1C MFR BLD: 5.3 % (ref 3.1–6.5)
POTASSIUM SERPL-SCNC: 4.4 MMOL/L (ref 3.5–5)
SODIUM: 137 MMOL/L (ref 134–144)

## 2019-07-15 ENCOUNTER — TELEPHONE (OUTPATIENT)
Dept: FAMILY MEDICINE | Facility: CLINIC | Age: 64
End: 2019-07-15

## 2019-07-15 NOTE — TELEPHONE ENCOUNTER
----- Message from Celestine Walsh MD sent at 7/13/2019  4:14 PM CDT -----  The results are within acceptable range.  Please keep regular follow up.

## 2019-08-16 DIAGNOSIS — I10 BENIGN ESSENTIAL HTN: ICD-10-CM

## 2019-08-16 RX ORDER — LISINOPRIL 10 MG/1
TABLET ORAL
Qty: 30 TABLET | Refills: 1 | Status: SHIPPED | OUTPATIENT
Start: 2019-08-16 | End: 2019-09-09 | Stop reason: SDUPTHER

## 2019-09-09 ENCOUNTER — OFFICE VISIT (OUTPATIENT)
Dept: FAMILY MEDICINE | Facility: CLINIC | Age: 64
End: 2019-09-09
Payer: COMMERCIAL

## 2019-09-09 VITALS
HEART RATE: 86 BPM | WEIGHT: 219 LBS | BODY MASS INDEX: 30.66 KG/M2 | DIASTOLIC BLOOD PRESSURE: 82 MMHG | SYSTOLIC BLOOD PRESSURE: 124 MMHG | HEIGHT: 71 IN

## 2019-09-09 DIAGNOSIS — Z12.5 SCREENING FOR PROSTATE CANCER: ICD-10-CM

## 2019-09-09 DIAGNOSIS — R73.03 PREDIABETES: ICD-10-CM

## 2019-09-09 DIAGNOSIS — I10 BENIGN ESSENTIAL HTN: Primary | ICD-10-CM

## 2019-09-09 PROCEDURE — 99213 PR OFFICE/OUTPT VISIT, EST, LEVL III, 20-29 MIN: ICD-10-PCS | Mod: S$GLB,,, | Performed by: INTERNAL MEDICINE

## 2019-09-09 PROCEDURE — 3008F PR BODY MASS INDEX (BMI) DOCUMENTED: ICD-10-PCS | Mod: S$GLB,,, | Performed by: INTERNAL MEDICINE

## 2019-09-09 PROCEDURE — 99213 OFFICE O/P EST LOW 20 MIN: CPT | Mod: S$GLB,,, | Performed by: INTERNAL MEDICINE

## 2019-09-09 PROCEDURE — 3074F PR MOST RECENT SYSTOLIC BLOOD PRESSURE < 130 MM HG: ICD-10-PCS | Mod: S$GLB,,, | Performed by: INTERNAL MEDICINE

## 2019-09-09 PROCEDURE — 99999 PR PBB SHADOW E&M-EST. PATIENT-LVL III: ICD-10-PCS | Mod: PBBFAC,,, | Performed by: INTERNAL MEDICINE

## 2019-09-09 PROCEDURE — 3074F SYST BP LT 130 MM HG: CPT | Mod: S$GLB,,, | Performed by: INTERNAL MEDICINE

## 2019-09-09 PROCEDURE — 3008F BODY MASS INDEX DOCD: CPT | Mod: S$GLB,,, | Performed by: INTERNAL MEDICINE

## 2019-09-09 PROCEDURE — 3079F PR MOST RECENT DIASTOLIC BLOOD PRESSURE 80-89 MM HG: ICD-10-PCS | Mod: S$GLB,,, | Performed by: INTERNAL MEDICINE

## 2019-09-09 PROCEDURE — 3079F DIAST BP 80-89 MM HG: CPT | Mod: S$GLB,,, | Performed by: INTERNAL MEDICINE

## 2019-09-09 PROCEDURE — 99999 PR PBB SHADOW E&M-EST. PATIENT-LVL III: CPT | Mod: PBBFAC,,, | Performed by: INTERNAL MEDICINE

## 2019-09-09 RX ORDER — LISINOPRIL 10 MG/1
10 TABLET ORAL DAILY
Qty: 90 TABLET | Refills: 2 | Status: SHIPPED | OUTPATIENT
Start: 2019-09-09 | End: 2020-06-02

## 2019-09-09 NOTE — PATIENT INSTRUCTIONS
Taking Your Blood Pressure  Blood pressure is the force of blood against the artery wall as it moves from the heart through the blood vessels. You can take your own blood pressure reading using a digital monitor. Take your readings the same each time, using the same arm. Take readings as often as your healthcare provider instructs.  About blood pressure monitors  Blood pressure monitors are designed for certain ages and cases. You can find monitors for older adults, for pregnant women, and for children. Make sure the one you choose is the right one for your age and situation.  The American Heart Association recommends an automatic cuff monitor that fits on your upper arm (bicep). The cuff should fit your arm size. A cuff thats too large or too small will not give an accurate reading. Measure around your upper arm to find your size.  Monitors that attach to your finger or wrist are not as accurate as monitors for your upper arm.  Ask your healthcare provider for help in choosing a monitor. Bring your monitor to your next provider visit if you need help in using it the correct way.  The steps below are general instructions for using an automatic digital monitor.  Step 1. Relax    · Take your blood pressure at the same time every day, such as in the morning or evening, or at the time your healthcare provider recommends.  · Wait at least a half-hour after smoking, eating, or exercising. Don't drink coffee, tea, soda, or other caffeinated beverages before checking your blood pressure.  · Sit comfortably at a table with both feet on the floor. Do not cross your legs or feet. Place the monitor near you.  · Rest for a few minutes before you begin.  Step 2. Wrap the cuff    · Place your arm on the table, palm up. Your arm should be at the level of your heart. Wrap the cuff around your upper arm, just above your elbow. Its best done on bare skin, not over clothing. Most cuffs will indicate where the brachial artery (the  blood vessel in the middle of the arm at the inner side of the elbow) should line up with the cuff. Look in your monitor's instruction booklet for an illustration. You can also bring your cuff to your healthcare provider and have them show you how to correctly place the cuff.  Step 3. Inflate the cuff    · Push the button that starts the pump.  · The cuff will tighten, then loosen.  · The numbers will change. When they stop changing, your blood pressure reading will appear.  · Take 2 or 3 readings one minute apart.  Step 4. Write down the results of each reading    · Write down your blood pressure numbers for each reading. Note the date and time. Keep your results in one place, such as a notebook. Even if your monitor has a built-in memory, keep a hard copy of the readings.  · Remove the cuff from your arm. Turn off the machine.  · Bring your blood pressure records with your healthcare providers at each visit.  · If you start a new blood pressure medicine, note the day you started the new medicine. Also note the day if you change the dose of your medicine. This information goes on your blood pressure recording sheet. This will help your healthcare provider monitor how well the medicine changes are working.  · Ask your healthcare provider what numbers should prompt you to call him or her. Also ask what numbers should prompt you to get help right away.  Date Last Reviewed: 11/1/2016  © 1684-7435 [employer]. [address] [place] [address], [place] [address]. All rights reserved. This information is not intended as a substitute for professional medical care. Always follow your healthcare professional's instructions.

## 2019-09-09 NOTE — PROGRESS NOTES
Subjective:       Patient ID: Pierce Kim is a 63 y.o. male.    Chief Complaint: Hypertension (lab review ) and Hyperlipidemia    Mr. Pierce Kim is a pleasant 63-year-old gentleman who comes for follow-up. His underlying hypertension. He is compliant with medications.  Through watching his diet and exercise he has lost approximately 28 lb of weight.  His blood sugar level has improved from 137-113.  His blood pressures are also under control at this point.    History of mild snoring and going to the bathroom couple of times at night have been noted but this does not seem to bother him.      He is a retired  and enjoying his postretirement life with his wife and family members.    No chest pains or shortness of breath. He did have a colonoscopy in 2017 and was found to have multiple polyps and has been recommended surveillance follow-up in 3-5 years.    No significant prostate symptoms.    Thus far he does not have any history of coronary artery disease or strokes.  No family history of coronary artery disease or strokes.  He is asking me about aspirin and I feel comfortable asking him to stop this medication hence forth.  No side effects experience from this medication in any case.    Hypertension   This is a chronic problem. The current episode started more than 1 year ago. The problem is unchanged. The problem is controlled. Pertinent negatives include no malaise/fatigue, palpitations or shortness of breath. There are no associated agents to hypertension. Risk factors for coronary artery disease include male gender and obesity. Past treatments include ACE inhibitors. The current treatment provides moderate improvement. There is no history of hyperaldosteronism, hypercortisolism, pheochromocytoma, renovascular disease or sleep apnea (snoring +).       Past Medical History:   Diagnosis Date    Allergy     Hyperlipidemia     Hypertension      Social History     Socioeconomic History     Marital status:      Spouse name: Zoila    Number of children: 3    Years of education: Not on file    Highest education level: Not on file   Occupational History    Occupation: Retired      Comment: [employer]   Social Needs    Financial resource strain: Not hard at all    Food insecurity:     Worry: Never true     Inability: Never true    Transportation needs:     Medical: No     Non-medical: No   Tobacco Use    Smoking status: Never Smoker    Smokeless tobacco: Never Used   Substance and Sexual Activity    Alcohol use: Yes     Frequency: 2-3 times a week     Drinks per session: 3 or 4     Binge frequency: Never     Comment: Moderate    Drug use: No    Sexual activity: Yes     Partners: Female   Lifestyle    Physical activity:     Days per week: 6 days     Minutes per session: 50 min    Stress: Not at all   Relationships    Social connections:     Talks on phone: More than three times a week     Gets together: Once a week     Attends Jewish service: Not on file     Active member of club or organization: Yes     Attends meetings of clubs or organizations: 1 to 4 times per year     Relationship status:    Other Topics Concern    Not on file   Social History Narrative    Not on file     Past Surgical History:   Procedure Laterality Date    COLONOSCOPY  2017     Family History   Problem Relation Age of Onset    Hypertension Mother     Dementia Mother     Emphysema Father     Cancer Father         Prostate & Esophageal CA    Hypertension Sister        Review of Systems   Constitutional: Negative for activity change, appetite change, malaise/fatigue and unexpected weight change (lost 28 lbs diet and exercise).   HENT: Negative for hearing loss, rhinorrhea, sneezing and trouble swallowing.    Eyes: Negative for pain, discharge and visual disturbance.   Respiratory: Negative for chest tightness, shortness of breath and wheezing.         Snoring but no formal  "diagnosis of sleep apnea.   Cardiovascular: Negative for palpitations and leg swelling.        Hypertension   Gastrointestinal: Negative for abdominal distention, blood in stool, constipation and diarrhea.   Endocrine: Negative for cold intolerance, heat intolerance, polydipsia, polyphagia and polyuria.   Genitourinary: Positive for frequency. Negative for difficulty urinating, dysuria, hematuria, scrotal swelling and urgency.        Occasional nocturia ×2 at nighttime   Musculoskeletal: Negative for back pain and gait problem.   Skin: Negative for pallor and wound.        Slightly dry skin in the inferior extremities   Allergic/Immunologic: Negative for environmental allergies, food allergies and immunocompromised state.   Neurological: Negative for dizziness, seizures, speech difficulty and light-headedness.   Hematological: Negative for adenopathy. Does not bruise/bleed easily.   Psychiatric/Behavioral: Negative for agitation, behavioral problems, confusion and dysphoric mood. The patient is not nervous/anxious.        Objective:       Vitals:    09/09/19 1020   BP: 124/82   Pulse: 86   Weight: 99.3 kg (219 lb)   Height: 5' 11" (1.803 m)     Physical Exam   Constitutional: He is oriented to person, place, and time. He appears well-developed.   HENT:   Head: Normocephalic and atraumatic.   Nose: Nose normal.   Mouth/Throat: Oropharynx is clear and moist. No oropharyngeal exudate.   Oral airways are fairly patent.   Eyes: Conjunctivae and EOM are normal. Right eye exhibits no discharge. Left eye exhibits no discharge. No scleral icterus.   Neck: Normal range of motion. Neck supple. No JVD present. No tracheal deviation present. No thyromegaly present.   Cardiovascular: Normal rate, regular rhythm and normal heart sounds. Exam reveals no gallop and no friction rub.   No murmur heard.  Pulmonary/Chest: Effort normal and breath sounds normal. No respiratory distress. He has no wheezes. He has no rales.   Abdominal: " Soft. Bowel sounds are normal. He exhibits no distension. There is no tenderness.   Musculoskeletal: Normal range of motion. He exhibits no tenderness or deformity.   Lymphadenopathy:     He has no cervical adenopathy.   Neurological: He is alert and oriented to person, place, and time. He has normal reflexes.   Skin: Skin is warm and dry.   Dry skin   Psychiatric: He has a normal mood and affect.   Nursing note and vitals reviewed.      Component      Latest Ref Rng & Units 6/2/2017   Glucose      70 - 99 mg/dL 116 (H)   Total Bilirubin      0.3 - 1.0 mg/dL 1.7 (H)   Bilirubin, Direct      0.0 - 0.2 mg/dL 0.2   HIV 1/2 EIA Ab Screen      NEGATIVE NEGATIVE     Assessment:       1. Benign essential HTN    2. Prediabetes    3. Screening for prostate cancer         Plan:           Benign essential HTN  -     lisinopril 10 MG tablet; Take 1 tablet (10 mg total) by mouth once daily.  Dispense: 90 tablet; Refill: 2  -     Basic metabolic panel; Future; Expected date: 03/02/2020    Prediabetes    Screening for prostate cancer  -     PSA, Screening; Future; Expected date: 03/02/2020     Patient has done well watching his diet and exercise and has managed to lose 28 lb of weight.  This will improve his health and overall general well-being significantly.    He does plan to lose another 8-9 lb of weight the next 6 months when he comes back for follow-up in 6 months for annual preventive physical.  If he does so, perhaps we may strongly consider discontinuing his blood pressure medication and if they are normal.    He will continue to watch his diet and exercise.    His blood sugar has improved from last time.    I am confident that next visit his blood sugar will be normalized and I will check a basic metabolic panel at that time and also a PSA.    Flu season starts now and will get the vaccinations in or office from October and he is welcome to stopped by our office to get a flu shot.    Fup 6 months

## 2020-03-04 ENCOUNTER — LAB VISIT (OUTPATIENT)
Dept: LAB | Facility: HOSPITAL | Age: 65
End: 2020-03-04
Attending: INTERNAL MEDICINE
Payer: COMMERCIAL

## 2020-03-04 DIAGNOSIS — I10 BENIGN ESSENTIAL HTN: ICD-10-CM

## 2020-03-04 DIAGNOSIS — Z12.5 SCREENING FOR PROSTATE CANCER: ICD-10-CM

## 2020-03-04 LAB
ANION GAP SERPL CALC-SCNC: 10 MMOL/L (ref 8–16)
BUN SERPL-MCNC: 28 MG/DL (ref 8–23)
CALCIUM SERPL-MCNC: 9.3 MG/DL (ref 8.7–10.5)
CHLORIDE SERPL-SCNC: 103 MMOL/L (ref 95–110)
CO2 SERPL-SCNC: 24 MMOL/L (ref 23–29)
COMPLEXED PSA SERPL-MCNC: 1.9 NG/ML (ref 0–4)
CREAT SERPL-MCNC: 1 MG/DL (ref 0.5–1.4)
EST. GFR  (AFRICAN AMERICAN): >60 ML/MIN/1.73 M^2
EST. GFR  (NON AFRICAN AMERICAN): >60 ML/MIN/1.73 M^2
GLUCOSE SERPL-MCNC: 119 MG/DL (ref 70–110)
POTASSIUM SERPL-SCNC: 4.6 MMOL/L (ref 3.5–5.1)
SODIUM SERPL-SCNC: 137 MMOL/L (ref 136–145)

## 2020-03-04 PROCEDURE — 84153 ASSAY OF PSA TOTAL: CPT

## 2020-03-04 PROCEDURE — 80048 BASIC METABOLIC PNL TOTAL CA: CPT

## 2020-03-04 PROCEDURE — 36415 COLL VENOUS BLD VENIPUNCTURE: CPT

## 2020-03-10 ENCOUNTER — OFFICE VISIT (OUTPATIENT)
Dept: FAMILY MEDICINE | Facility: CLINIC | Age: 65
End: 2020-03-10
Payer: COMMERCIAL

## 2020-03-10 VITALS
HEIGHT: 71 IN | RESPIRATION RATE: 16 BRPM | SYSTOLIC BLOOD PRESSURE: 122 MMHG | DIASTOLIC BLOOD PRESSURE: 80 MMHG | HEART RATE: 72 BPM | WEIGHT: 228 LBS | BODY MASS INDEX: 31.92 KG/M2

## 2020-03-10 DIAGNOSIS — Z23 NEED FOR SHINGLES VACCINE: ICD-10-CM

## 2020-03-10 DIAGNOSIS — Z00.00 ANNUAL PHYSICAL EXAM: Primary | ICD-10-CM

## 2020-03-10 PROCEDURE — 3074F PR MOST RECENT SYSTOLIC BLOOD PRESSURE < 130 MM HG: ICD-10-PCS | Mod: S$GLB,,, | Performed by: INTERNAL MEDICINE

## 2020-03-10 PROCEDURE — 3074F SYST BP LT 130 MM HG: CPT | Mod: S$GLB,,, | Performed by: INTERNAL MEDICINE

## 2020-03-10 PROCEDURE — 3079F DIAST BP 80-89 MM HG: CPT | Mod: S$GLB,,, | Performed by: INTERNAL MEDICINE

## 2020-03-10 PROCEDURE — 3079F PR MOST RECENT DIASTOLIC BLOOD PRESSURE 80-89 MM HG: ICD-10-PCS | Mod: S$GLB,,, | Performed by: INTERNAL MEDICINE

## 2020-03-10 PROCEDURE — 99396 PREV VISIT EST AGE 40-64: CPT | Mod: S$GLB,,, | Performed by: INTERNAL MEDICINE

## 2020-03-10 PROCEDURE — 99396 PR PREVENTIVE VISIT,EST,40-64: ICD-10-PCS | Mod: S$GLB,,, | Performed by: INTERNAL MEDICINE

## 2020-03-10 NOTE — PATIENT INSTRUCTIONS

## 2020-03-10 NOTE — PROGRESS NOTES
Subjective:       Patient ID: Pierce Kim is a 64 y.o. male.    Chief Complaint: Annual Exam (lab review )    Mr. Pierce Kim is a pleasant 64-year-old  gentleman who comes for annual checkup.  Thus far his medical issues include hypertension which is very well under control.  He has been very particular and careful about watching his diet and weight management.    As far as preventive care issues are concerned, he is mostly updated on majority of preventive care issues.  He did have a colonoscopy in 2017 and multiple polyps of benign nature with discovered.  He was recommended follow-up surveillance within 3-5 years.  This was done by Dr. Paulo Nicole.    He is also updated on tetanus vaccination and flu shots.  He does get a regular eye checkup.  He also gets dental checkup 3 times a year.    He is very particular and watchful on his diet and his wife Cooks healthy food.  They eat white needs like chicken and fish and avoid greasy and fatty food.    He and his wife go for walk at least 6 times a week.  Is a retired  and enjoying his life and stay in [place].  His origin only from [place].  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    Family history also has been reviewed.    The only thing which is pending at this point is shingles vaccine and we had a discussion about this.      Past Medical History:   Diagnosis Date    Hyperlipidemia     Hypertension      Social History     Socioeconomic History    Marital status:      Spouse name: Zoila    Number of children: 3    Years of education: Not on file    Highest education level: Not on file   Occupational History    Occupation: Retired      Comment: [employer]   Social Needs    Financial resource strain: Not hard at all    Food insecurity:     Worry: Never true     Inability: Never true    Transportation needs:     Medical: No     Non-medical: No   Tobacco Use    Smoking status: Never Smoker     Smokeless tobacco: Never Used   Substance and Sexual Activity    Alcohol use: Yes     Frequency: 2-3 times a week     Drinks per session: 3 or 4     Binge frequency: Never     Comment: occasional    Drug use: No    Sexual activity: Yes     Partners: Female   Lifestyle    Physical activity:     Days per week: 6 days     Minutes per session: 50 min    Stress: Not at all   Relationships    Social connections:     Talks on phone: More than three times a week     Gets together: Once a week     Attends [employer] service: Not on file     Active member of club or organization: Yes     Attends meetings of clubs or organizations: 1 to 4 times per year     Relationship status:    Other Topics Concern    Not on file   Social History Narrative    Not on file     Past Surgical History:   Procedure Laterality Date    COLONOSCOPY  2017     Family History   Problem Relation Age of Onset    Hypertension Mother     Dementia Mother     Emphysema Father     Cancer Father         Prostate & Esophageal CA    Hypertension Sister        Review of Systems   Constitutional: Negative for activity change, appetite change and unexpected weight change (lost 28 lbs diet and exercise. gained 9 lbs- Bob gras fun).   HENT: Negative for congestion, hearing loss, rhinorrhea, sneezing and trouble swallowing.    Eyes: Negative for pain, discharge and visual disturbance.   Respiratory: Negative for chest tightness, shortness of breath and wheezing.         Snoring but no formal diagnosis of sleep apnea.   Cardiovascular: Negative for palpitations and leg swelling.        Hypertension   Gastrointestinal: Negative for abdominal distention, blood in stool, constipation and diarrhea.   Endocrine: Negative for cold intolerance, heat intolerance, polydipsia, polyphagia and polyuria.   Genitourinary: Positive for frequency. Negative for difficulty urinating, dysuria, hematuria, scrotal swelling and urgency.        Occasional nocturia ×2 at  "nighttime Mostly once.    Musculoskeletal: Negative for back pain and gait problem.   Skin: Negative for pallor and wound.        Slightly dry skin in the inferior extremities   Allergic/Immunologic: Negative for environmental allergies, food allergies and immunocompromised state.   Neurological: Negative for dizziness, seizures, speech difficulty and light-headedness.   Hematological: Negative for adenopathy. Does not bruise/bleed easily.   Psychiatric/Behavioral: Negative for agitation, behavioral problems, confusion and dysphoric mood. The patient is not nervous/anxious.          Objective:      Blood pressure 122/80, pulse 72, resp. rate 16, height 5' 11" (1.803 m), weight 103.4 kg (228 lb). Body mass index is 31.8 kg/m².  Physical Exam   Constitutional: He is oriented to person, place, and time. He appears well-developed.   HENT:   Head: Normocephalic and atraumatic.   Nose: Nose normal.   Mouth/Throat: Oropharynx is clear and moist. No oropharyngeal exudate.   Oral airways are fairly patent.  Patient's hearing is intact to whispers at approximately 6 ft bilaterally.  His hearing is adequate with distraction also.   Eyes: Conjunctivae and EOM are normal. Right eye exhibits no discharge. Left eye exhibits no discharge. No scleral icterus.   Patient's vision is intact to 1 in letters at approximately 4-5 week bilaterally.  Color vision is normal to multiple colors.   Neck: Normal range of motion. Neck supple. No JVD present. No tracheal deviation present. No thyromegaly present.   There is no lymphadenopathy.   Cardiovascular: Normal rate, regular rhythm and normal heart sounds. Exam reveals no gallop and no friction rub.   No murmur heard.  Pulmonary/Chest: Effort normal and breath sounds normal. No respiratory distress. He has no wheezes. He has no rales.   Abdominal: Soft. Bowel sounds are normal. He exhibits no distension. There is no tenderness.   Musculoskeletal: Normal range of motion. He exhibits no " tenderness or deformity.   Slightly tight hamstrings bilaterally.   Lymphadenopathy:     He has no cervical adenopathy.   Neurological: He is alert and oriented to person, place, and time. He displays no tremor.   Skin: Skin is warm and dry.   Dry skin   Psychiatric: He has a normal mood and affect.   Nursing note and vitals reviewed.        Assessment:       1. Annual physical exam    2. Need for shingles vaccine           Lab Visit on 03/04/2020   Component Date Value Ref Range Status    Sodium 03/04/2020 137  136 - 145 mmol/L Final    Potassium 03/04/2020 4.6  3.5 - 5.1 mmol/L Final    Chloride 03/04/2020 103  95 - 110 mmol/L Final    CO2 03/04/2020 24  23 - 29 mmol/L Final    Glucose 03/04/2020 119* 70 - 110 mg/dL Final    BUN, Bld 03/04/2020 28* 8 - 23 mg/dL Final    Creatinine 03/04/2020 1.0  0.5 - 1.4 mg/dL Final    Calcium 03/04/2020 9.3  8.7 - 10.5 mg/dL Final    Anion Gap 03/04/2020 10  8 - 16 mmol/L Final    eGFR if African American 03/04/2020 >60.0  >60 mL/min/1.73 m^2 Final    eGFR if non African American 03/04/2020 >60.0  >60 mL/min/1.73 m^2 Final    PSA, SCREEN 03/04/2020 1.9  0.00 - 4.00 ng/mL Final         Plan:           Annual physical exam  -     Lipid panel; Future; Expected date: 03/10/2020    Need for shingles vaccine  -     varicella-zoster [id number], PF, (SHINGRIX, PF,) 50 mcg/0.5 mL injection; Inject 0.5 mLs into the muscle once. First vaccination now and repeat booster dose between 2-6 months. for 1 dose  Dispense: 0.5 mL; Refill: 1        Mr. Pelayo presents with generally good physical examination.  His blood pressures are good.  His and height and weight ratio is fairly good except that he has gained some weight recently and thanks to Bob Steinberg.  Hopefully the next visit will see a better weight status.    He is updated on most of the preventive care issues including tetanus vaccination, colonoscopy and influenza vaccination.  There is no indication for pneumonia vaccine  because he is healthy.    Shingles vaccine is not recommended at a earlier age even before 65 and I will give him a prescription for the same and he will check with the pharmacist about coverage.    I had checked a basic chemistry and blood sugars was slightly elevated.  Hopefully with some weight loss and watching his sugars and with eleazar martinez gone, his sugars will be better next time.    Advised Mr. Kim about age and season appropriate immunizations/ cancer screenings.  Also seasonal influenza vaccine, update on tetanus diphtheria vaccination every 10 years.      Follow-up in 6 months or earlier as needed.      Current Outpatient Medications:     lisinopril 10 MG tablet, Take 1 tablet (10 mg total) by mouth once daily., Disp: 90 tablet, Rfl: 2    multivitamin capsule, Take 1 capsule by mouth once daily., Disp: , Rfl:     varicella-zoster [id number], PF, (SHINGRIX, PF,) 50 mcg/0.5 mL injection, Inject 0.5 mLs into the muscle once. First vaccination now and repeat booster dose between 2-6 months. for 1 dose, Disp: 0.5 mL, Rfl: 1

## 2020-10-20 ENCOUNTER — LAB VISIT (OUTPATIENT)
Dept: LAB | Facility: HOSPITAL | Age: 65
End: 2020-10-20
Attending: INTERNAL MEDICINE
Payer: COMMERCIAL

## 2020-10-20 DIAGNOSIS — Z00.00 ANNUAL PHYSICAL EXAM: ICD-10-CM

## 2020-10-20 LAB
CHOLEST SERPL-MCNC: 208 MG/DL (ref 120–199)
CHOLEST/HDLC SERPL: 4.6 {RATIO} (ref 2–5)
HDLC SERPL-MCNC: 45 MG/DL (ref 40–75)
HDLC SERPL: 21.6 % (ref 20–50)
LDLC SERPL CALC-MCNC: 150.2 MG/DL (ref 63–159)
NONHDLC SERPL-MCNC: 163 MG/DL
TRIGL SERPL-MCNC: 64 MG/DL (ref 30–150)

## 2020-10-20 PROCEDURE — 36415 COLL VENOUS BLD VENIPUNCTURE: CPT

## 2020-10-20 PROCEDURE — 80061 LIPID PANEL: CPT

## 2020-12-01 ENCOUNTER — OFFICE VISIT (OUTPATIENT)
Dept: FAMILY MEDICINE | Facility: CLINIC | Age: 65
End: 2020-12-01
Payer: COMMERCIAL

## 2020-12-01 VITALS
HEART RATE: 90 BPM | SYSTOLIC BLOOD PRESSURE: 131 MMHG | HEIGHT: 71 IN | BODY MASS INDEX: 33.6 KG/M2 | TEMPERATURE: 97 F | DIASTOLIC BLOOD PRESSURE: 81 MMHG | WEIGHT: 240 LBS

## 2020-12-01 DIAGNOSIS — I10 BENIGN ESSENTIAL HTN: Primary | ICD-10-CM

## 2020-12-01 DIAGNOSIS — E78.5 MILD HYPERLIPIDEMIA: ICD-10-CM

## 2020-12-01 DIAGNOSIS — Z12.5 SCREENING FOR PROSTATE CANCER: ICD-10-CM

## 2020-12-01 PROCEDURE — 3008F BODY MASS INDEX DOCD: CPT | Mod: S$GLB,,, | Performed by: INTERNAL MEDICINE

## 2020-12-01 PROCEDURE — 1125F AMNT PAIN NOTED PAIN PRSNT: CPT | Mod: S$GLB,,, | Performed by: INTERNAL MEDICINE

## 2020-12-01 PROCEDURE — 3079F PR MOST RECENT DIASTOLIC BLOOD PRESSURE 80-89 MM HG: ICD-10-PCS | Mod: S$GLB,,, | Performed by: INTERNAL MEDICINE

## 2020-12-01 PROCEDURE — 3075F SYST BP GE 130 - 139MM HG: CPT | Mod: S$GLB,,, | Performed by: INTERNAL MEDICINE

## 2020-12-01 PROCEDURE — 1125F PR PAIN SEVERITY QUANTIFIED, PAIN PRESENT: ICD-10-PCS | Mod: S$GLB,,, | Performed by: INTERNAL MEDICINE

## 2020-12-01 PROCEDURE — 99213 PR OFFICE/OUTPT VISIT, EST, LEVL III, 20-29 MIN: ICD-10-PCS | Mod: S$GLB,,, | Performed by: INTERNAL MEDICINE

## 2020-12-01 PROCEDURE — 3075F PR MOST RECENT SYSTOLIC BLOOD PRESS GE 130-139MM HG: ICD-10-PCS | Mod: S$GLB,,, | Performed by: INTERNAL MEDICINE

## 2020-12-01 PROCEDURE — 99213 OFFICE O/P EST LOW 20 MIN: CPT | Mod: S$GLB,,, | Performed by: INTERNAL MEDICINE

## 2020-12-01 PROCEDURE — 3008F PR BODY MASS INDEX (BMI) DOCUMENTED: ICD-10-PCS | Mod: S$GLB,,, | Performed by: INTERNAL MEDICINE

## 2020-12-01 PROCEDURE — 3079F DIAST BP 80-89 MM HG: CPT | Mod: S$GLB,,, | Performed by: INTERNAL MEDICINE

## 2020-12-01 NOTE — PROGRESS NOTES
Subjective:       Patient ID: Pierce Kim is a 65 y.o. male.    Chief Complaint: Hypertension and Hyperlipidemia    Mr. Pierce Kim is a pleasant 65-year-old gentleman who comes for follow-up. Hi has underlying hypertension. He is compliant with medications.      In past he had lost reasonable amount of weight up to 224 lb or so.  However when he went out to 214 lb he did not feel well.  However he has gained weight to 240 lb back again.    His blood pressures are doing good.    His lipid panel has been reviewed and it seems to be more elevated as compared to 2018.  Currently he is not on any medications for lipid-lowering.    History of mild snoring and going to the bathroom couple of times at night have been noted but this does not seem to bother him.      He is a retired  and enjoying his postretirement life with his wife and family members.    No chest pains or shortness of breath. He did have a colonoscopy in 2017 and was found to have multiple polyps and has been recommended surveillance follow-up in 3-5 years.  He is due for the surveillance colonoscopy now.    He did also have a eye checkup and there seems to be some evidence of allergic conjunctivitis in the left eye.  He will have a follow-up soon.  Dr. Harris in [place]..    No significant prostate symptoms.    Thus far he does not have any history of coronary artery disease or strokes.  No family history of coronary artery disease or strokes.  He is asking me about aspirin and I feel comfortable asking him to stop this medication hence forth.  No side effects experience from this medication in any case.    Hypertension  This is a chronic problem. The current episode started more than 1 year ago. The problem is unchanged. The problem is controlled. Pertinent negatives include no chest pain, headaches, malaise/fatigue, neck pain, palpitations or shortness of breath. There are no associated agents to hypertension. Risk factors for  coronary artery disease include male gender and obesity. Past treatments include ACE inhibitors. The current treatment provides moderate improvement. There is no history of hyperaldosteronism, hypercortisolism, pheochromocytoma, renovascular disease or sleep apnea (snoring +).       Past Medical History:   Diagnosis Date    Hyperlipidemia     Hypertension      Social History     Socioeconomic History    Marital status:      Spouse name: Zoila    Number of children: 3    Years of education: Not on file    Highest education level: Not on file   Occupational History    Occupation: Retired      Comment: [employer]   Social Needs    Financial resource strain: Not hard at all    Food insecurity     Worry: Never true     Inability: Never true    Transportation needs     Medical: No     Non-medical: No   Tobacco Use    Smoking status: Never Smoker    Smokeless tobacco: Never Used   Substance and Sexual Activity    Alcohol use: Yes     Frequency: Monthly or less     Drinks per session: 1 or 2     Binge frequency: Never     Comment: occasional    Drug use: No    Sexual activity: Yes     Partners: Female   Lifestyle    Physical activity     Days per week: 6 days     Minutes per session: 50 min    Stress: Only a little   Relationships    Social connections     Talks on phone: More than three times a week     Gets together: Once a week     Attends [employer] service: Not on file     Active member of club or organization: Yes     Attends meetings of clubs or organizations: More than 4 times per year     Relationship status:    Other Topics Concern    Not on file   Social History Narrative    Not on file     Past Surgical History:   Procedure Laterality Date    COLONOSCOPY  2017     Family History   Problem Relation Age of Onset    Hypertension Mother     Dementia Mother     Emphysema Father     Cancer Father         Prostate & Esophageal CA    Hypertension Sister   "      Review of Systems   Constitutional: Negative for activity change, appetite change, chills, fatigue, fever, malaise/fatigue and unexpected weight change (lost 28 lbs diet and exercise.gained 19 lbs back).   HENT: Negative for congestion, hearing loss, rhinorrhea, sneezing and trouble swallowing.    Eyes: Negative for photophobia, pain, discharge and visual disturbance.   Respiratory: Negative for cough, chest tightness, shortness of breath and wheezing.         Snoring but no formal diagnosis of sleep apnea.   Cardiovascular: Negative for chest pain, palpitations and leg swelling.        Hypertension   Gastrointestinal: Negative for abdominal distention, blood in stool, constipation, diarrhea and vomiting.   Endocrine: Negative for cold intolerance, heat intolerance, polydipsia, polyphagia and polyuria.   Genitourinary: Positive for frequency (1-2 times at night). Negative for difficulty urinating, dysuria and hematuria.        Occasional nocturia ×2 at nighttime   Musculoskeletal: Negative for arthralgias, back pain, gait problem, joint swelling and neck pain.   Skin: Negative for pallor and wound.        Slightly dry skin in the inferior extremities   Allergic/Immunologic: Negative for environmental allergies, food allergies and immunocompromised state.   Neurological: Negative for dizziness, seizures, speech difficulty, weakness, light-headedness and headaches.   Hematological: Negative for adenopathy. Does not bruise/bleed easily.   Psychiatric/Behavioral: Negative for agitation, behavioral problems, confusion and dysphoric mood. The patient is not nervous/anxious.        Objective:       Vitals:    12/01/20 1012   BP: 131/81   Pulse: 90   Temp: 97.2 °F (36.2 °C)   Weight: 108.9 kg (240 lb)   Height: 5' 11" (1.803 m)     Vitals:    12/01/20 1012   BP: 131/81   Pulse: 90   Temp: 97.2 °F (36.2 °C)   Weight: 108.9 kg (240 lb)   Height: 5' 11" (1.803 m)     Physical Exam  Vitals signs and nursing note reviewed. "   Constitutional:       Appearance: He is well-developed.   HENT:      Head: Normocephalic and atraumatic.      Mouth/Throat:      Pharynx: No oropharyngeal exudate.   Eyes:      General: No scleral icterus.        Right eye: No discharge.         Left eye: No discharge.      Conjunctiva/sclera:      Left eye: Left conjunctiva is injected.     Neck:      Musculoskeletal: Normal range of motion and neck supple.      Thyroid: No thyromegaly.      Vascular: No JVD.      Trachea: No tracheal deviation.   Cardiovascular:      Rate and Rhythm: Normal rate and regular rhythm.      Heart sounds: Normal heart sounds. No murmur. No friction rub. No gallop.    Pulmonary:      Effort: Pulmonary effort is normal. No respiratory distress.      Breath sounds: Normal breath sounds. No wheezing or rales.   Abdominal:      General: Bowel sounds are normal. There is no distension.      Palpations: Abdomen is soft.      Tenderness: There is no abdominal tenderness.   Musculoskeletal: Normal range of motion.         General: No tenderness or deformity.   Lymphadenopathy:      Cervical: No cervical adenopathy.   Skin:     General: Skin is warm and dry.      Comments: Dry skin   Neurological:      Mental Status: He is alert and oriented to person, place, and time.      Deep Tendon Reflexes: Reflexes are normal and symmetric.         Component      Latest Ref Rng & Units 6/2/2017   Glucose      70 - 99 mg/dL 116 (H)   Total Bilirubin      0.3 - 1.0 mg/dL 1.7 (H)   Bilirubin, Direct      0.0 - 0.2 mg/dL 0.2   HIV 1/2 EIA Ab Screen      NEGATIVE NEGATIVE     Assessment:       1. Benign essential HTN    2. Mild hyperlipidemia    3. Screening for prostate cancer         Plan:           Benign essential HTN  -     Basic Metabolic Panel; Future; Expected date: 05/01/2021    Mild hyperlipidemia  -     Lipid Panel; Future; Expected date: 05/01/2021    Screening for prostate cancer  -     PSA, Screening; Future; Expected date: 05/01/2021        Patient's blood pressures are doing okay.  His lipid panel has gone up which is a reflection of his gain in weight.  I am sure by the next time he comes back for follow-up in 6 months time will see loss of about 10-15 lb of weight and will check a lipid panel again at that point.      He is updated on the influenza vaccination.  He will be due for his surveillance colonoscopy soon.    He does have some conjunctivitis in the left eye and I suspect it might be allergic/autoimmune conjunctivitis.  He does have a eye appointment tomorrow and I would ask him to send me a copy of that also.    He will continue to remain safe during the COVID-19 pandemic season and utilize the basic precautions.  Continue with efforts at watching his diet and exercise.  More greens and vegetables and less of fatty and fried food.    We did discuss about in inculcating some hobbies or interests which will keep him engaged with life and the good things it has to offer.  He is looking into fishing.  He also likes to do some woodworking or carpentry.    I will set him a challenge for losing approximately 10 lb of weight when he comes back for follow-up in 6 months time.  He accepts the challenge.  He promises  to do even better.    Fup 6 months

## 2020-12-01 NOTE — PATIENT INSTRUCTIONS
Aerobic Exercise for a Healthy Heart  Exercise is a lot more than an energy booster and a stress reliever. It also strengthens your heart muscle, lowers your blood pressure and cholesterol, and burns calories. It can also improve your resting muscle tone, and your mood.     Remember, some activity is better than none.    Choose an aerobic activity  Choose an activity that makes your heart and lungs work harder than they do when you rest or walk normally. This aerobic exercise can improve the way your heart and other muscles use oxygen. Make it fun by exercising with a friend and choosing an activity you enjoy. Here are some ideas:  · Walking  · Swimming  · Bicycling  · Stair climbing  · Dancing  · Jogging  · Gardening  Exercise regularly  If you havent been exercising regularly,  get your doctors OK first. Then start slowly.  Here are some tips:  · Begin exercising 3 times a week for 5 to 10 minutes at a time.  · When you feel comfortable, add a few minutes each session.  · Slowly build up to exercising 3 to 4 times each week. Each session should last for 40 minutes, on average, and involve moderate- to vigorous-intensity physical activity.  · If you have been given nitroglycerin, be sure to carry it when you exercise.  · If you get chest pain (angina) when youre exercising, stop what youre doing, take your nitroglycerin, and call your doctor.  Date Last Reviewed: 6/2/2016 © 2000-2017 [email or web address]. [address] [place] [id number]. All rights reserved. This information is not intended as a substitute for professional medical care. Always follow your healthcare professional's instructions.        Losing Weight for Heart Health  Excess weight is a major risk factor for heart disease. Losing weight has many benefits including lowering your blood pressure, improving your cholesterol level, and decreasing your risk for diseases such as diabetes and heart disease. It may help keep your  arteries open so that your heart can get the oxygen-rich blood it needs. All in all, losing weight makes you healthier.     Exercise with a friend. When activity is fun, you're more likely to stick with it.   Calories and weight loss  · Calories are the fuel your body burns for energy. You get the calories you need from the food you eat. For healthy weight loss, women should eat at least 1,200 calories a day, men at least 1,500.  · When you eat more calories than you need, your body stores the extra calories as fat. One pound of fat equals 3,500 calories.  · To lose weight, try to reduce your total calorie intake by 500 calories. To do this, eat 250 calories less each day. Add activity to burn the other 250 calories. Walking 2.5 miles burns about 250 calories. Other more intense activities can burn more calories in the time you spend doing them, such as swimming and running. It is important to understand that reducing calorie intake is much more effective at weight loss than is exercise.  · Eat a variety of healthy foods to get the nutrients you need.  Tips for losing weight  · Drink 8 to 10 glasses of water a day.  · Dont skip meals. Instead, eat smaller portions.  · Eat your meals earlier in the day.  · Cut out sugary drinks such as soda and fruit juices.  · Make your later meals lighter than your earlier meals.   Brisk activity is best  Brisk activity gets your heart pumping faster and it makes it healthier. Its also a great way to burn calories. In fact, your body may keep burning calories for hours after you stop a brisk activity:  · Begin by walking 10 minutes most days.  · Add more time and speed to your walk. Build up as you feel able.  · Aim for 3 to 4 sessions of aerobic exercise a week. Each session should last about 40 minutes and include moderate to vigorous physical activity.  · The most important part of the activity is that you break a sweat. This indicates your heart is working hard enough to burn  fat.  Date Last Reviewed: 6/1/2016  © 7636-4605 [employer]. [address] [place] [address], [place] [address]. All rights reserved. This information is not intended as a substitute for professional medical care. Always follow your healthcare professional's instructions.        Weight Management: Take It Off and Keep It Off    Its easy to be motivated when you first start. The key is to stay motivated all along the way and to have realistic and achievable goals. There are things you can do to keep yourself on the path to success.  Dont focus on daily weight gains and losses. Instead, weigh yourself no more than once a week at the same time of day. Weighing yourself each day will probably only frustrate you.    Stay motivated  Here are suggestions to keep you motivated:   · Remind yourself of your goals. Post them near the refrigerator or desk where you work.  · Make daily entries in your diary or journal about your activity and eating. A visual reminder of success, like a gold star, can help keep you going.  · Every week, take time to look back on how much youve accomplished, and the changes you may have made.  · Try taking a nutrition class. It can help you learn new shopping, cooking, and eating skills, and also meet new people. You might try a low-fat cooking or yoga class.  · Dont be hard on yourself or give up if you slip. Be patient. Learn from your mistakes and adjust your plan if you need to. Then get right back to it.  · Be realistic about your goals. Talk with a dietitian or your healthcare provider about what goals are reasonable for you.   Believe that you can do it  How you think about yourself is just as important as what you do. If you dont think you can succeed, chances are you wont. Believe that you can stick to your plan and meet your goals:  · If you dont meet a goal, dont use it as an excuse to give up on your whole plan. Adjust your goal and try again.  · Try to understand your own  attitude about food.  Are you subject to emotional eating?  · Learn how to accept compliments. Even if you get embarrassed, just say thank you.  · Make a list of the things that others like about you and that you like about yourself. Add something new from time to time. Keep this list to look at when you need a lift.  Resources  · The Presidents Circle on Fitness, Sports & Nutritionwww.fitness.gov  · Academy of Nutrition and Dieteticswww.eatright.org  · Healthfinderwww.healthfinder.gov  Date Last Reviewed: 2/4/2016 © 2000-2017 [employer]. [address] [place] [address], [place] [address]. All rights reserved. This information is not intended as a substitute for professional medical care. Always follow your healthcare professional's instructions.        Weight Management: Healthy Eating  Food is your bodys fuel. You cant live without it. The key is to give your body enough nutrients and energy without eating too much. Reading food labels can help you make healthy choices. Also, learn new eating habits to manage your weight.     All the values on the label are based on one serving. The serving size is the average portion. Remember to multiply the values on the label by the number of servings you eat.   Eat less fat  A gram of fat has almost 2.5 times the calories of a gram of protein or carbohydrates. Try to balance your food choices so that only 20% to 35% of your calories comes from total fat. This means an average of 2½ to 3½ grams of fat for each 100 calories you eat.  Eat more fiber  High-fiber foods are digested more slowly than low-fiber foods, so you feel full longer. Try to get at least 25 grams of fiber each day for a 2000 calorie diet. Foods high in fiber include:  · Vegetables and fruits  · Whole-grain or bran breads, pastas, and cereals  · Legumes (beans) and peas  As you begin to eat more fiber, be sure to drink plenty of water to keep your digestive system working smoothly.  Tips  Do's and  don'ts include:   · Dont skip meals. This often leads to overeating later on. Its best to spread your eating throughout the day.  · Eat a variety of foods, not just a few favorites.  · If you find yourself eating when youre not hungry, ask yourself why. Many of us eat when were bored, stressed, or just to be polite. Listen to your body. If youre not hungry, get busy doing something else instead of eating.  · Eat slower, shooting for 20 to 30 minutes for each meal. It takes 20 minutes for your stomach to tell your brain that its full. Slow eaters tend to eat less and are still satisfied, while fast eaters may tend to be overeaters.   · Pay attention to what you eat. Dont read or watch TV during your meal.  Date Last Reviewed: 1/31/2016  © 8354-0113 [employer] [address] [place] [address], [place] [address]. All rights reserved. This information is not intended as a substitute for professional medical care. Always follow your healthcare professional's instructions.        Weight Management: Overcoming Your Barriers    You may have many reasons why youre not ready to lose weight. You may not feel you have the time or the skills. You may be afraid of losing weight and gaining it back again. Well, you can lose weight. And you can keep the weight off, if you make changes slowly and stick with them. Remember that you may never find the perfect time to lose weight. Decide that the right time to be healthier is now.  Common barriers  Barrier 1: I dont want to deny myself.  Barrier Buster: You dont have to! Moderation is the key:  · Watch portion sizes and know when you're eating more than one serving.  · Plan to ask for a doggy bag when you eat out.  · Have just one.  · Choose lower-fat and lower-calorie versions of your favorites.  · Use a small plate instead of a normal-sized plate.   Barrier 2: I lost weight before but I gained it right back.  Barrier Buster: Make this time different:  · List what  worked and didnt work last time and what you can try this time.  · Choose changes that you are willing to stick with.  · Work exercise into your weight-loss plan.  · Be realistic about what is possible. Your plan has to fit into your life in a balanced way that works for you.   Barrier 3: I dont have the time to be active.  Barrier Buster: It takes just a few minutes a day!  · Be active with a pet or the kids.  · Block off activity time in your schedule.  · Borrow some time that you usually spend watching TV.  · You are too important not to take time to exercise--it is your life!   Feel good about yourself  Do you eat more because you feel bad about yourself, then feel even worse as you gain weight? This is a vicious cycle. Breaking this cycle is not easy. You may need group support or counseling. Always remember that you are a valuable person, no matter what size or shape you are.  Do you have a health problem? If so, dont use it as an excuse for not losing weight. Ask your healthcare provider or dietitian about methods to lose weight that are safe for you. For example, even if you have severe arthritis, it may be easier for you to exercise in a pool. Get advice from a .    Date Last Reviewed: 2/2/2016  © 2774-4368 [employer]. [address] [place] [address], [place] [address]. All rights reserved. This information is not intended as a substitute for professional medical care. Always follow your healthcare professional's instructions.        Weight Management: Overcoming Your Barriers    You may have many reasons why youre not ready to lose weight. You may not feel you have the time or the skills. You may be afraid of losing weight and gaining it back again. Well, you can lose weight. And you can keep the weight off, if you make changes slowly and stick with them. Remember that you may never find the perfect time to lose weight. Decide that the right time to be healthier is  now.  Common barriers  Barrier 1: I dont want to deny myself.  Barrier Buster: You dont have to! Moderation is the key:  · Watch portion sizes and know when you're eating more than one serving.  · Plan to ask for a doggy bag when you eat out.  · Have just one.  · Choose lower-fat and lower-calorie versions of your favorites.  · Use a small plate instead of a normal-sized plate.   Barrier 2: I lost weight before but I gained it right back.  Barrier Buster: Make this time different:  · List what worked and didnt work last time and what you can try this time.  · Choose changes that you are willing to stick with.  · Work exercise into your weight-loss plan.  · Be realistic about what is possible. Your plan has to fit into your life in a balanced way that works for you.   Barrier 3: I dont have the time to be active.  Barrier Buster: It takes just a few minutes a day!  · Be active with a pet or the kids.  · Block off activity time in your schedule.  · Borrow some time that you usually spend watching TV.  · You are too important not to take time to exercise--it is your life!   Feel good about yourself  Do you eat more because you feel bad about yourself, then feel even worse as you gain weight? This is a vicious cycle. Breaking this cycle is not easy. You may need group support or counseling. Always remember that you are a valuable person, no matter what size or shape you are.  Do you have a health problem? If so, dont use it as an excuse for not losing weight. Ask your healthcare provider or dietitian about methods to lose weight that are safe for you. For example, even if you have severe arthritis, it may be easier for you to exercise in a pool. Get advice from a .    Date Last Reviewed: 2/2/2016  © 5994-1324 [employer]. [address] [place] [address], [place] [address]. All rights reserved. This information is not intended as a substitute for professional medical care. Always follow  your healthcare professional's instructions.

## 2021-01-26 ENCOUNTER — OFFICE VISIT (OUTPATIENT)
Dept: FAMILY MEDICINE | Facility: CLINIC | Age: 66
End: 2021-01-26
Payer: COMMERCIAL

## 2021-01-26 DIAGNOSIS — R09.82 POST-NASAL DRAINAGE: ICD-10-CM

## 2021-01-26 DIAGNOSIS — R05.9 COUGH: Primary | ICD-10-CM

## 2021-01-26 PROCEDURE — 99212 PR OFFICE/OUTPT VISIT, EST, LEVL II, 10-19 MIN: ICD-10-PCS | Mod: 95,,, | Performed by: NURSE PRACTITIONER

## 2021-01-26 PROCEDURE — 99212 OFFICE O/P EST SF 10 MIN: CPT | Mod: 95,,, | Performed by: NURSE PRACTITIONER

## 2021-01-26 RX ORDER — PROMETHAZINE HYDROCHLORIDE AND DEXTROMETHORPHAN HYDROBROMIDE 6.25; 15 MG/5ML; MG/5ML
5 SYRUP ORAL EVERY 6 HOURS PRN
Qty: 180 ML | Refills: 0 | Status: SHIPPED | OUTPATIENT
Start: 2021-01-26 | End: 2021-01-31

## 2021-01-26 RX ORDER — FLUTICASONE PROPIONATE 50 MCG
1 SPRAY, SUSPENSION (ML) NASAL 2 TIMES DAILY
Qty: 16 G | Refills: 1 | Status: SHIPPED | OUTPATIENT
Start: 2021-01-26 | End: 2021-05-03

## 2021-01-27 ENCOUNTER — TELEPHONE (OUTPATIENT)
Dept: FAMILY MEDICINE | Facility: CLINIC | Age: 66
End: 2021-01-27

## 2021-01-27 DIAGNOSIS — U07.1 COVID-19 VIRUS INFECTION: Primary | ICD-10-CM

## 2021-01-28 ENCOUNTER — INFUSION (OUTPATIENT)
Dept: INFECTIOUS DISEASES | Facility: HOSPITAL | Age: 66
End: 2021-01-28
Attending: NURSE PRACTITIONER
Payer: COMMERCIAL

## 2021-01-28 VITALS
SYSTOLIC BLOOD PRESSURE: 135 MMHG | OXYGEN SATURATION: 99 % | WEIGHT: 240 LBS | TEMPERATURE: 98 F | DIASTOLIC BLOOD PRESSURE: 77 MMHG | RESPIRATION RATE: 18 BRPM | BODY MASS INDEX: 33.6 KG/M2 | HEIGHT: 71 IN | HEART RATE: 83 BPM

## 2021-01-28 DIAGNOSIS — U07.1 COVID-19 VIRUS INFECTION: Primary | ICD-10-CM

## 2021-01-28 PROCEDURE — 63600175 PHARM REV CODE 636 W HCPCS: Performed by: NURSE PRACTITIONER

## 2021-01-28 PROCEDURE — M0243 CASIRIVI AND IMDEVI INFUSION: HCPCS | Performed by: NURSE PRACTITIONER

## 2021-01-28 PROCEDURE — 25000003 PHARM REV CODE 250: Performed by: NURSE PRACTITIONER

## 2021-01-28 RX ORDER — ACETAMINOPHEN 325 MG/1
650 TABLET ORAL ONCE AS NEEDED
Status: DISCONTINUED | OUTPATIENT
Start: 2021-01-28 | End: 2021-09-27

## 2021-01-28 RX ORDER — ALBUTEROL SULFATE 90 UG/1
2 AEROSOL, METERED RESPIRATORY (INHALATION)
Status: DISCONTINUED | OUTPATIENT
Start: 2021-01-28 | End: 2021-09-27

## 2021-01-28 RX ORDER — DIPHENHYDRAMINE HYDROCHLORIDE 50 MG/ML
25 INJECTION INTRAMUSCULAR; INTRAVENOUS ONCE AS NEEDED
Status: DISCONTINUED | OUTPATIENT
Start: 2021-01-28 | End: 2021-09-27

## 2021-01-28 RX ORDER — EPINEPHRINE 0.1 MG/ML
0.3 INJECTION INTRAVENOUS
Status: DISCONTINUED | OUTPATIENT
Start: 2021-01-28 | End: 2021-09-27

## 2021-01-28 RX ORDER — SODIUM CHLORIDE 0.9 % (FLUSH) 0.9 %
10 SYRINGE (ML) INJECTION
Status: ACTIVE | OUTPATIENT
Start: 2021-01-28

## 2021-01-28 RX ORDER — ONDANSETRON 4 MG/1
4 TABLET, ORALLY DISINTEGRATING ORAL ONCE AS NEEDED
Status: DISCONTINUED | OUTPATIENT
Start: 2021-01-28 | End: 2021-09-27

## 2021-01-28 RX ADMIN — SODIUM CHLORIDE: 0.9 INJECTION, SOLUTION INTRAVENOUS at 11:01

## 2021-01-28 RX ADMIN — CASIRIVIMAB: 1332 INJECTION, SOLUTION, CONCENTRATE INTRAVENOUS at 12:01

## 2021-03-01 DIAGNOSIS — I10 BENIGN ESSENTIAL HTN: ICD-10-CM

## 2021-03-01 RX ORDER — LISINOPRIL 10 MG/1
10 TABLET ORAL DAILY
Qty: 90 TABLET | Refills: 2 | Status: SHIPPED | OUTPATIENT
Start: 2021-03-01 | End: 2021-10-04

## 2021-04-28 ENCOUNTER — HOSPITAL ENCOUNTER (EMERGENCY)
Facility: HOSPITAL | Age: 66
Discharge: HOME | End: 2021-04-28
Attending: EMERGENCY MEDICINE
Payer: COMMERCIAL

## 2021-04-28 VITALS
TEMPERATURE: 98 F | WEIGHT: 237 LBS | SYSTOLIC BLOOD PRESSURE: 125 MMHG | HEIGHT: 71 IN | OXYGEN SATURATION: 100 % | RESPIRATION RATE: 19 BRPM | HEART RATE: 79 BPM | BODY MASS INDEX: 33.18 KG/M2 | DIASTOLIC BLOOD PRESSURE: 86 MMHG

## 2021-04-28 DIAGNOSIS — H57.11 ACUTE RIGHT EYE PAIN: Primary | ICD-10-CM

## 2021-04-28 PROCEDURE — 99282 EMERGENCY DEPT VISIT SF MDM: CPT

## 2021-05-03 ENCOUNTER — OFFICE VISIT (OUTPATIENT)
Dept: FAMILY MEDICINE | Facility: CLINIC | Age: 66
End: 2021-05-03
Payer: COMMERCIAL

## 2021-05-03 VITALS
SYSTOLIC BLOOD PRESSURE: 112 MMHG | HEART RATE: 88 BPM | HEIGHT: 71 IN | WEIGHT: 241 LBS | BODY MASS INDEX: 33.74 KG/M2 | DIASTOLIC BLOOD PRESSURE: 72 MMHG

## 2021-05-03 DIAGNOSIS — I10 BENIGN ESSENTIAL HTN: ICD-10-CM

## 2021-05-03 DIAGNOSIS — Z01.818 PREOPERATIVE CLEARANCE: Primary | ICD-10-CM

## 2021-05-03 DIAGNOSIS — R73.03 PREDIABETES: ICD-10-CM

## 2021-05-03 PROCEDURE — 3078F PR MOST RECENT DIASTOLIC BLOOD PRESSURE < 80 MM HG: ICD-10-PCS | Mod: S$GLB,,, | Performed by: INTERNAL MEDICINE

## 2021-05-03 PROCEDURE — 3008F BODY MASS INDEX DOCD: CPT | Mod: S$GLB,,, | Performed by: INTERNAL MEDICINE

## 2021-05-03 PROCEDURE — 3074F SYST BP LT 130 MM HG: CPT | Mod: S$GLB,,, | Performed by: INTERNAL MEDICINE

## 2021-05-03 PROCEDURE — 3074F PR MOST RECENT SYSTOLIC BLOOD PRESSURE < 130 MM HG: ICD-10-PCS | Mod: S$GLB,,, | Performed by: INTERNAL MEDICINE

## 2021-05-03 PROCEDURE — 99213 OFFICE O/P EST LOW 20 MIN: CPT | Mod: S$GLB,,, | Performed by: INTERNAL MEDICINE

## 2021-05-03 PROCEDURE — 3008F PR BODY MASS INDEX (BMI) DOCUMENTED: ICD-10-PCS | Mod: S$GLB,,, | Performed by: INTERNAL MEDICINE

## 2021-05-03 PROCEDURE — 3078F DIAST BP <80 MM HG: CPT | Mod: S$GLB,,, | Performed by: INTERNAL MEDICINE

## 2021-05-03 PROCEDURE — 1126F PR PAIN SEVERITY QUANTIFIED, NO PAIN PRESENT: ICD-10-PCS | Mod: S$GLB,,, | Performed by: INTERNAL MEDICINE

## 2021-05-03 PROCEDURE — 99213 PR OFFICE/OUTPT VISIT, EST, LEVL III, 20-29 MIN: ICD-10-PCS | Mod: S$GLB,,, | Performed by: INTERNAL MEDICINE

## 2021-05-03 PROCEDURE — 1126F AMNT PAIN NOTED NONE PRSNT: CPT | Mod: S$GLB,,, | Performed by: INTERNAL MEDICINE

## 2021-05-12 ENCOUNTER — PATIENT MESSAGE (OUTPATIENT)
Dept: RESEARCH | Facility: HOSPITAL | Age: 66
End: 2021-05-12

## 2021-09-22 ENCOUNTER — LAB VISIT (OUTPATIENT)
Dept: LAB | Facility: HOSPITAL | Age: 66
End: 2021-09-22
Attending: INTERNAL MEDICINE
Payer: COMMERCIAL

## 2021-09-22 DIAGNOSIS — I10 BENIGN ESSENTIAL HTN: ICD-10-CM

## 2021-09-22 DIAGNOSIS — E78.5 MILD HYPERLIPIDEMIA: ICD-10-CM

## 2021-09-22 DIAGNOSIS — Z12.5 SCREENING FOR PROSTATE CANCER: ICD-10-CM

## 2021-09-22 LAB
ANION GAP SERPL CALC-SCNC: 9 MMOL/L (ref 8–16)
BUN SERPL-MCNC: 26 MG/DL (ref 8–23)
CALCIUM SERPL-MCNC: 8.9 MG/DL (ref 8.7–10.5)
CHLORIDE SERPL-SCNC: 105 MMOL/L (ref 95–110)
CHOLEST SERPL-MCNC: 196 MG/DL (ref 120–199)
CHOLEST/HDLC SERPL: 4.9 {RATIO} (ref 2–5)
CO2 SERPL-SCNC: 26 MMOL/L (ref 23–29)
COMPLEXED PSA SERPL-MCNC: 1.6 NG/ML (ref 0–4)
CREAT SERPL-MCNC: 1 MG/DL (ref 0.5–1.4)
EST. GFR  (AFRICAN AMERICAN): >60 ML/MIN/1.73 M^2
EST. GFR  (NON AFRICAN AMERICAN): >60 ML/MIN/1.73 M^2
GLUCOSE SERPL-MCNC: 117 MG/DL (ref 70–110)
HDLC SERPL-MCNC: 40 MG/DL (ref 40–75)
HDLC SERPL: 20.4 % (ref 20–50)
LDLC SERPL CALC-MCNC: 141.4 MG/DL (ref 63–159)
NONHDLC SERPL-MCNC: 156 MG/DL
POTASSIUM SERPL-SCNC: 3.8 MMOL/L (ref 3.5–5.1)
SODIUM SERPL-SCNC: 140 MMOL/L (ref 136–145)
TRIGL SERPL-MCNC: 73 MG/DL (ref 30–150)

## 2021-09-22 PROCEDURE — 80048 BASIC METABOLIC PNL TOTAL CA: CPT | Performed by: INTERNAL MEDICINE

## 2021-09-22 PROCEDURE — 84153 ASSAY OF PSA TOTAL: CPT | Performed by: INTERNAL MEDICINE

## 2021-09-22 PROCEDURE — 80061 LIPID PANEL: CPT | Performed by: INTERNAL MEDICINE

## 2021-09-22 PROCEDURE — 36415 COLL VENOUS BLD VENIPUNCTURE: CPT | Performed by: INTERNAL MEDICINE

## 2021-09-27 ENCOUNTER — OFFICE VISIT (OUTPATIENT)
Dept: FAMILY MEDICINE | Facility: CLINIC | Age: 66
End: 2021-09-27
Payer: COMMERCIAL

## 2021-09-27 ENCOUNTER — PATIENT MESSAGE (OUTPATIENT)
Dept: FAMILY MEDICINE | Facility: CLINIC | Age: 66
End: 2021-09-27

## 2021-09-27 VITALS
OXYGEN SATURATION: 98 % | SYSTOLIC BLOOD PRESSURE: 133 MMHG | HEIGHT: 71 IN | WEIGHT: 240 LBS | BODY MASS INDEX: 33.6 KG/M2 | HEART RATE: 68 BPM | DIASTOLIC BLOOD PRESSURE: 79 MMHG

## 2021-09-27 DIAGNOSIS — Z00.00 ANNUAL PHYSICAL EXAM: Primary | ICD-10-CM

## 2021-09-27 PROCEDURE — 4010F ACE/ARB THERAPY RXD/TAKEN: CPT | Mod: S$GLB,,, | Performed by: INTERNAL MEDICINE

## 2021-09-27 PROCEDURE — 99396 PREV VISIT EST AGE 40-64: CPT | Mod: S$GLB,,, | Performed by: INTERNAL MEDICINE

## 2021-09-27 PROCEDURE — 3008F PR BODY MASS INDEX (BMI) DOCUMENTED: ICD-10-PCS | Mod: S$GLB,,, | Performed by: INTERNAL MEDICINE

## 2021-09-27 PROCEDURE — 3075F SYST BP GE 130 - 139MM HG: CPT | Mod: S$GLB,,, | Performed by: INTERNAL MEDICINE

## 2021-09-27 PROCEDURE — 3078F PR MOST RECENT DIASTOLIC BLOOD PRESSURE < 80 MM HG: ICD-10-PCS | Mod: S$GLB,,, | Performed by: INTERNAL MEDICINE

## 2021-09-27 PROCEDURE — 1160F PR REVIEW ALL MEDS BY PRESCRIBER/CLIN PHARMACIST DOCUMENTED: ICD-10-PCS | Mod: S$GLB,,, | Performed by: INTERNAL MEDICINE

## 2021-09-27 PROCEDURE — 99396 PR PREVENTIVE VISIT,EST,40-64: ICD-10-PCS | Mod: S$GLB,,, | Performed by: INTERNAL MEDICINE

## 2021-09-27 PROCEDURE — 1160F RVW MEDS BY RX/DR IN RCRD: CPT | Mod: S$GLB,,, | Performed by: INTERNAL MEDICINE

## 2021-09-27 PROCEDURE — 4010F PR ACE/ARB THEARPY RXD/TAKEN: ICD-10-PCS | Mod: S$GLB,,, | Performed by: INTERNAL MEDICINE

## 2021-09-27 PROCEDURE — 3008F BODY MASS INDEX DOCD: CPT | Mod: S$GLB,,, | Performed by: INTERNAL MEDICINE

## 2021-09-27 PROCEDURE — 3075F PR MOST RECENT SYSTOLIC BLOOD PRESS GE 130-139MM HG: ICD-10-PCS | Mod: S$GLB,,, | Performed by: INTERNAL MEDICINE

## 2021-09-27 PROCEDURE — 3078F DIAST BP <80 MM HG: CPT | Mod: S$GLB,,, | Performed by: INTERNAL MEDICINE

## 2022-06-01 ENCOUNTER — PATIENT MESSAGE (OUTPATIENT)
Dept: FAMILY MEDICINE | Facility: CLINIC | Age: 67
End: 2022-06-01

## 2022-06-01 ENCOUNTER — TELEPHONE (OUTPATIENT)
Dept: FAMILY MEDICINE | Facility: CLINIC | Age: 67
End: 2022-06-01

## 2022-06-01 DIAGNOSIS — I10 BENIGN ESSENTIAL HTN: Primary | ICD-10-CM

## 2022-06-01 DIAGNOSIS — R73.03 PREDIABETES: ICD-10-CM

## 2022-06-01 NOTE — PROGRESS NOTES
Patient requests labs prior to follow-up.    Benign essential HTN  -     Basic Metabolic Panel; Future; Expected date: 06/01/2022  -     ALT (SGPT); Future; Expected date: 06/01/2022  -     Microalbumin/Creatinine Ratio, Urine; Future; Expected date: 06/01/2022    Prediabetes  -     Hemoglobin A1C; Future; Expected date: 06/01/2022        Lab requests have been sent to [facility] and you can do them fasting in the morning.  There is also urine test.  PSA test is not do at this point.

## 2022-06-02 ENCOUNTER — TELEPHONE (OUTPATIENT)
Dept: FAMILY MEDICINE | Facility: CLINIC | Age: 67
End: 2022-06-02

## 2022-06-02 ENCOUNTER — LAB VISIT (OUTPATIENT)
Dept: LAB | Facility: HOSPITAL | Age: 67
End: 2022-06-02
Attending: INTERNAL MEDICINE
Payer: COMMERCIAL

## 2022-06-02 DIAGNOSIS — I10 BENIGN ESSENTIAL HTN: ICD-10-CM

## 2022-06-02 DIAGNOSIS — R73.03 PREDIABETES: ICD-10-CM

## 2022-06-02 LAB
ALBUMIN/CREAT UR: 3.3 UG/MG (ref 0–30)
ALT SERPL W/O P-5'-P-CCNC: 39 U/L (ref 10–44)
ANION GAP SERPL CALC-SCNC: 10 MMOL/L (ref 8–16)
BUN SERPL-MCNC: 32 MG/DL (ref 8–23)
CALCIUM SERPL-MCNC: 9.1 MG/DL (ref 8.7–10.5)
CHLORIDE SERPL-SCNC: 101 MMOL/L (ref 95–110)
CO2 SERPL-SCNC: 24 MMOL/L (ref 23–29)
CREAT SERPL-MCNC: 1 MG/DL (ref 0.5–1.4)
CREAT UR-MCNC: 132 MG/DL (ref 23–375)
EST. GFR  (AFRICAN AMERICAN): >60 ML/MIN/1.73 M^2
EST. GFR  (NON AFRICAN AMERICAN): >60 ML/MIN/1.73 M^2
ESTIMATED AVG GLUCOSE: 128 MG/DL (ref 68–131)
GLUCOSE SERPL-MCNC: 177 MG/DL (ref 70–110)
HBA1C MFR BLD: 6.1 % (ref 4.5–6.2)
MICROALBUMIN UR DL<=1MG/L-MCNC: 4.4 UG/ML
POTASSIUM SERPL-SCNC: 4.4 MMOL/L (ref 3.5–5.1)
SODIUM SERPL-SCNC: 135 MMOL/L (ref 136–145)

## 2022-06-02 PROCEDURE — 84460 ALANINE AMINO (ALT) (SGPT): CPT | Performed by: INTERNAL MEDICINE

## 2022-06-02 PROCEDURE — 80048 BASIC METABOLIC PNL TOTAL CA: CPT | Performed by: INTERNAL MEDICINE

## 2022-06-02 PROCEDURE — 36415 COLL VENOUS BLD VENIPUNCTURE: CPT | Performed by: INTERNAL MEDICINE

## 2022-06-02 PROCEDURE — 83036 HEMOGLOBIN GLYCOSYLATED A1C: CPT | Performed by: INTERNAL MEDICINE

## 2022-06-02 PROCEDURE — 82043 UR ALBUMIN QUANTITATIVE: CPT | Performed by: INTERNAL MEDICINE

## 2022-06-02 PROCEDURE — 82570 ASSAY OF URINE CREATININE: CPT | Performed by: INTERNAL MEDICINE

## 2022-06-02 NOTE — TELEPHONE ENCOUNTER
----- Message from Celestine Walsh MD sent at 6/1/2022  7:55 AM CDT -----  Regarding: Lab request  I have sent the lab request to the [facility] lab for this patient.  He can do it fasting any day including today.  There will be a urine test for microalbumin also.  No PSA because it is not do within 1 year.

## 2022-06-05 NOTE — PROGRESS NOTES
Subjective:       Patient ID: Pierce Segundo is a 66 y.o. male.    Chief Complaint: Hypertension    This is a 6 months follow-up for Mr. Clemente Segundo who is a 66-year-old  male.  Previous visit was for annual physical.    Underlying issues of hypertension have been noted and also prediabetes.  He also has some snoring though no formal diagnosis of sleep apnea.  Bilirubin levels tend to be slightly elevated suggestive of Gilbert's syndrome.    Overall he is doing fairly well and tends to watch his diet with requisite amount of exercise and activities.    Recent labs had shown elevated blood sugar of 177. This level is elevated.    Preventive care issues also have been reviewed and is not pending for colon cancer surveillance which was done last time 5 years back.    Now that he is 66-he will be eligible for pneumonia vaccines and Prevnar 20 will be offered to him.          Hypertension  This is a chronic problem. The current episode started more than 1 year ago. The problem is controlled. Pertinent negatives include no chest pain, headaches, neck pain or palpitations. Risk factors for coronary artery disease include male gender and dyslipidemia. Past treatments include ACE inhibitors. The current treatment provides moderate improvement. There is no history of kidney disease, PVD or retinopathy. There is no history of chronic renal disease or sleep apnea.       Past Medical History:   Diagnosis Date    Hyperlipidemia     Hypertension      Social History     Socioeconomic History    Marital status:      Spouse name: Zoila    Number of children: 3   Occupational History    Occupation: Retired      Comment: [employer] [place]   Tobacco Use    Smoking status: Never Smoker    Smokeless tobacco: Never Used   Substance and Sexual Activity    Alcohol use: Yes     Comment: occasional    Drug use: No    Sexual activity: Yes     Partners: Female   Social History Narrative    2 G Lori 40 ,  radha 28     1 B TJ 36         3     Social Determinants of Health     Financial Resource Strain: Low Risk     Difficulty of Paying Living Expenses: Not hard at all   Food Insecurity: No Food Insecurity    Worried About Running Out of Food in the Last Year: Never true    Ran Out of Food in the Last Year: Never true   Transportation Needs: Unknown    Lack of Transportation (Medical): No   Physical Activity: Sufficiently Active    Days of Exercise per Week: 6 days    Minutes of Exercise per Session: 40 min   Stress: No Stress Concern Present    Feeling of Stress : Not at all   Social Connections: Unknown    Frequency of Communication with Friends and Family: More than three times a week    Frequency of Social Gatherings with Friends and Family: More than three times a week    Active Member of Clubs or Organizations: Yes    Attends Club or Organization Meetings: More than 4 times per year    Marital Status:    Housing Stability: Low Risk     Unable to Pay for Housing in the Last Year: No    Number of Places Lived in the Last Year: 1    Unstable Housing in the Last Year: No     Past Surgical History:   Procedure Laterality Date    COLONOSCOPY  2017    EYE SURGERY       Family History   Problem Relation Age of Onset    Hypertension Mother     Dementia Mother     Emphysema Father     Cancer Father         Prostate & Esophageal CA    Hypertension Sister         Takes 10 mg of lisinopril.       Review of Systems   Constitutional: Negative for activity change, chills, fatigue, fever and unexpected weight change (gained 8 lbs).        History of COVID-19   HENT: Negative for hearing loss, rhinorrhea and trouble swallowing.    Eyes: Negative for discharge, redness and visual disturbance.        Needs surgery for a bleb at the back of his right retina.   Respiratory: Negative for cough, chest tightness and wheezing. Apnea: Snoring.         No formal diagnosis of sleep apnea but wife points out to  "him that he does snore at night.   Cardiovascular: Negative for chest pain, palpitations and leg swelling.        Hypertension   Gastrointestinal: Negative for abdominal distention, abdominal pain, blood in stool, constipation, diarrhea and vomiting.   Endocrine: Negative for cold intolerance, polydipsia and polyuria.        Elevated blood sugar-Blood sugars have gone further higher.  Hemoglobin A1c also has gone up.   Genitourinary: Negative for difficulty urinating, enuresis, hematuria and urgency.        No significant prostate symptoms at this point.   Musculoskeletal: Negative for arthralgias, joint swelling and neck pain.   Neurological: Negative for dizziness, weakness and headaches.   Psychiatric/Behavioral: Negative for confusion and dysphoric mood.         Objective:      Blood pressure 123/76, pulse 82, resp. rate 20, height 5' 11" (1.803 m), weight 112.6 kg (248 lb 3.2 oz), SpO2 97 %. Body mass index is 34.62 kg/m².  Physical Exam  Vitals and nursing note reviewed.   Constitutional:       General: He is not in acute distress.     Appearance: He is well-developed. He is not ill-appearing, toxic-appearing or diaphoretic.      Comments: BMI 34.62   HENT:      Head: Normocephalic and atraumatic.      Mouth/Throat:      Pharynx: No oropharyngeal exudate.   Eyes:      General: No scleral icterus.        Right eye: No discharge.         Left eye: No discharge.   Neck:      Thyroid: No thyromegaly.      Vascular: No JVD.      Trachea: No tracheal deviation.   Cardiovascular:      Rate and Rhythm: Normal rate and regular rhythm.      Heart sounds: Normal heart sounds. No murmur heard.    No friction rub. No gallop.   Pulmonary:      Effort: Pulmonary effort is normal. No respiratory distress.      Breath sounds: Normal breath sounds. No wheezing or rales.   Abdominal:      General: There is no distension.      Palpations: Abdomen is soft.      Tenderness: There is no abdominal tenderness.   Musculoskeletal:       "   General: No tenderness or deformity.      Cervical back: Neck supple.      Right lower leg: No edema.      Left lower leg: No edema.   Lymphadenopathy:      Cervical: No cervical adenopathy.   Skin:     General: Skin is warm and dry.      Findings: No lesion or rash.      Comments: Dry skin   Neurological:      Mental Status: He is alert. Mental status is at baseline.      Motor: No weakness.      Deep Tendon Reflexes: Reflexes are normal and symmetric.   Psychiatric:         Behavior: Behavior normal.         Judgment: Judgment normal.           Assessment:       1. Benign essential HTN    2. Prediabetes    3. Screening for prostate cancer           Lab Visit on 06/02/2022   Component Date Value Ref Range Status    Hemoglobin A1C 06/02/2022 6.1  4.5 - 6.2 % Final    Estimated Avg Glucose 06/02/2022 128  68 - 131 mg/dL Final    Sodium 06/02/2022 135 (A) 136 - 145 mmol/L Final    Potassium 06/02/2022 4.4  3.5 - 5.1 mmol/L Final    Chloride 06/02/2022 101  95 - 110 mmol/L Final    CO2 06/02/2022 24  23 - 29 mmol/L Final    Glucose 06/02/2022 177 (A) 70 - 110 mg/dL Final    BUN 06/02/2022 32 (A) 8 - 23 mg/dL Final    Creatinine 06/02/2022 1.0  0.5 - 1.4 mg/dL Final    Calcium 06/02/2022 9.1  8.7 - 10.5 mg/dL Final    Anion Gap 06/02/2022 10  8 - 16 mmol/L Final    eGFR if African American 06/02/2022 >60.0  >60 mL/min/1.73 m^2 Final    eGFR if non African American 06/02/2022 >60.0  >60 mL/min/1.73 m^2 Final    ALT 06/02/2022 39  10 - 44 U/L Final    Microalbumin, Urine 06/02/2022 4.4  <19.9 ug/mL Final    Creatinine, Urine 06/02/2022 132.0  23.0 - 375.0 mg/dL Final    Microalb/Creat Ratio 06/02/2022 3.3  0.0 - 30.0 ug/mg Final         Plan:           Benign essential HTN  -     Lipid Panel; Future; Expected date: 09/19/2022  -     Basic Metabolic Panel; Future; Expected date: 09/19/2022    Prediabetes  -     Hemoglobin A1C; Future; Expected date: 09/19/2022    Screening for prostate cancer  -     PSA,  Screening; Future; Expected date: 09/19/2022    On today's office visit, his blood pressures are noted to be in range.    His labs do show elevated blood sugars and I would like to check a hemoglobin A1c.  His overall weight is increased.    Preventive care issues.  Due for colonoscopy.    Patient's blood pressures are doing okay.  Cholesterol level was done last year and it was in good range.  I suspect it might have gone up at this point because he has gained about 8 lb of weight.  He has been a little more generous with his diet off late.  He did enjoy the [employer] boil recently with his family.    His hemoglobin A1c has gone up and his blood sugars also have gone up.      I am going to set a challenge for him to lose about 10 lb of weight when I see him the next 4-6 months time.  I will check his prostate test at that point hemoglobin A1c.      He did talk about diet pills to temporarily help him lose the weight.  I did speak to him about the pros and cons of that and see how he does in 4 months but I am still open to the option.      We had a talk about weight loss and he promises me that when he comes back in 4 months he will lose about 15 lb of weight.        Current Outpatient Medications:     lisinopriL 10 MG tablet, TAKE 1 TABLET(10 MG) BY MOUTH EVERY DAY, Disp: 90 tablet, Rfl: 2    multivitamin capsule, Take 1 capsule by mouth once daily., Disp: , Rfl:     timolol maleate 0.5% (TIMOPTIC) 0.5 % Drop, , Disp: , Rfl:     Current Facility-Administered Medications:     sodium chloride 0.9% flush 10 mL, 10 mL, Intravenous, PRN, VALERIA Bello

## 2022-06-06 ENCOUNTER — PATIENT MESSAGE (OUTPATIENT)
Dept: FAMILY MEDICINE | Facility: CLINIC | Age: 67
End: 2022-06-06

## 2022-06-06 ENCOUNTER — OFFICE VISIT (OUTPATIENT)
Dept: FAMILY MEDICINE | Facility: CLINIC | Age: 67
End: 2022-06-06
Payer: COMMERCIAL

## 2022-06-06 VITALS
BODY MASS INDEX: 34.75 KG/M2 | DIASTOLIC BLOOD PRESSURE: 76 MMHG | RESPIRATION RATE: 20 BRPM | HEART RATE: 82 BPM | SYSTOLIC BLOOD PRESSURE: 123 MMHG | HEIGHT: 71 IN | OXYGEN SATURATION: 97 % | WEIGHT: 248.19 LBS

## 2022-06-06 DIAGNOSIS — I10 BENIGN ESSENTIAL HTN: Primary | ICD-10-CM

## 2022-06-06 DIAGNOSIS — R73.03 PREDIABETES: ICD-10-CM

## 2022-06-06 DIAGNOSIS — Z12.5 SCREENING FOR PROSTATE CANCER: ICD-10-CM

## 2022-06-06 PROCEDURE — 3008F BODY MASS INDEX DOCD: CPT | Mod: CPTII,S$GLB,, | Performed by: INTERNAL MEDICINE

## 2022-06-06 PROCEDURE — 4010F PR ACE/ARB THEARPY RXD/TAKEN: ICD-10-PCS | Mod: CPTII,S$GLB,, | Performed by: INTERNAL MEDICINE

## 2022-06-06 PROCEDURE — 3078F DIAST BP <80 MM HG: CPT | Mod: CPTII,S$GLB,, | Performed by: INTERNAL MEDICINE

## 2022-06-06 PROCEDURE — 3288F FALL RISK ASSESSMENT DOCD: CPT | Mod: CPTII,S$GLB,, | Performed by: INTERNAL MEDICINE

## 2022-06-06 PROCEDURE — 3061F PR NEG MICROALBUMINURIA RESULT DOCUMENTED/REVIEW: ICD-10-PCS | Mod: CPTII,S$GLB,, | Performed by: INTERNAL MEDICINE

## 2022-06-06 PROCEDURE — 3044F HG A1C LEVEL LT 7.0%: CPT | Mod: CPTII,S$GLB,, | Performed by: INTERNAL MEDICINE

## 2022-06-06 PROCEDURE — 1101F PR PT FALLS ASSESS DOC 0-1 FALLS W/OUT INJ PAST YR: ICD-10-PCS | Mod: CPTII,S$GLB,, | Performed by: INTERNAL MEDICINE

## 2022-06-06 PROCEDURE — 1159F MED LIST DOCD IN RCRD: CPT | Mod: CPTII,S$GLB,, | Performed by: INTERNAL MEDICINE

## 2022-06-06 PROCEDURE — 3078F PR MOST RECENT DIASTOLIC BLOOD PRESSURE < 80 MM HG: ICD-10-PCS | Mod: CPTII,S$GLB,, | Performed by: INTERNAL MEDICINE

## 2022-06-06 PROCEDURE — 4010F ACE/ARB THERAPY RXD/TAKEN: CPT | Mod: CPTII,S$GLB,, | Performed by: INTERNAL MEDICINE

## 2022-06-06 PROCEDURE — 99213 PR OFFICE/OUTPT VISIT, EST, LEVL III, 20-29 MIN: ICD-10-PCS | Mod: S$GLB,,, | Performed by: INTERNAL MEDICINE

## 2022-06-06 PROCEDURE — 3066F NEPHROPATHY DOC TX: CPT | Mod: CPTII,S$GLB,, | Performed by: INTERNAL MEDICINE

## 2022-06-06 PROCEDURE — 1101F PT FALLS ASSESS-DOCD LE1/YR: CPT | Mod: CPTII,S$GLB,, | Performed by: INTERNAL MEDICINE

## 2022-06-06 PROCEDURE — 1126F AMNT PAIN NOTED NONE PRSNT: CPT | Mod: CPTII,S$GLB,, | Performed by: INTERNAL MEDICINE

## 2022-06-06 PROCEDURE — 1126F PR PAIN SEVERITY QUANTIFIED, NO PAIN PRESENT: ICD-10-PCS | Mod: CPTII,S$GLB,, | Performed by: INTERNAL MEDICINE

## 2022-06-06 PROCEDURE — 1160F RVW MEDS BY RX/DR IN RCRD: CPT | Mod: CPTII,S$GLB,, | Performed by: INTERNAL MEDICINE

## 2022-06-06 PROCEDURE — 3074F SYST BP LT 130 MM HG: CPT | Mod: CPTII,S$GLB,, | Performed by: INTERNAL MEDICINE

## 2022-06-06 PROCEDURE — 1160F PR REVIEW ALL MEDS BY PRESCRIBER/CLIN PHARMACIST DOCUMENTED: ICD-10-PCS | Mod: CPTII,S$GLB,, | Performed by: INTERNAL MEDICINE

## 2022-06-06 PROCEDURE — 3074F PR MOST RECENT SYSTOLIC BLOOD PRESSURE < 130 MM HG: ICD-10-PCS | Mod: CPTII,S$GLB,, | Performed by: INTERNAL MEDICINE

## 2022-06-06 PROCEDURE — 3288F PR FALLS RISK ASSESSMENT DOCUMENTED: ICD-10-PCS | Mod: CPTII,S$GLB,, | Performed by: INTERNAL MEDICINE

## 2022-06-06 PROCEDURE — 3061F NEG MICROALBUMINURIA REV: CPT | Mod: CPTII,S$GLB,, | Performed by: INTERNAL MEDICINE

## 2022-06-06 PROCEDURE — 3044F PR MOST RECENT HEMOGLOBIN A1C LEVEL <7.0%: ICD-10-PCS | Mod: CPTII,S$GLB,, | Performed by: INTERNAL MEDICINE

## 2022-06-06 PROCEDURE — 3008F PR BODY MASS INDEX (BMI) DOCUMENTED: ICD-10-PCS | Mod: CPTII,S$GLB,, | Performed by: INTERNAL MEDICINE

## 2022-06-06 PROCEDURE — 3066F PR DOCUMENTATION OF TREATMENT FOR NEPHROPATHY: ICD-10-PCS | Mod: CPTII,S$GLB,, | Performed by: INTERNAL MEDICINE

## 2022-06-06 PROCEDURE — 1159F PR MEDICATION LIST DOCUMENTED IN MEDICAL RECORD: ICD-10-PCS | Mod: CPTII,S$GLB,, | Performed by: INTERNAL MEDICINE

## 2022-06-06 PROCEDURE — 99213 OFFICE O/P EST LOW 20 MIN: CPT | Mod: S$GLB,,, | Performed by: INTERNAL MEDICINE

## 2022-06-06 RX ORDER — TIMOLOL MALEATE 5 MG/ML
SOLUTION/ DROPS OPHTHALMIC
COMMUNITY
Start: 2022-05-21

## 2022-10-17 DIAGNOSIS — I10 BENIGN ESSENTIAL HTN: ICD-10-CM

## 2022-10-17 RX ORDER — LISINOPRIL 10 MG/1
10 TABLET ORAL DAILY
Qty: 90 TABLET | Refills: 2 | Status: SHIPPED | OUTPATIENT
Start: 2022-10-17 | End: 2023-06-24

## 2023-03-07 ENCOUNTER — LAB VISIT (OUTPATIENT)
Dept: LAB | Facility: HOSPITAL | Age: 68
End: 2023-03-07
Attending: INTERNAL MEDICINE
Payer: MEDICARE

## 2023-03-07 DIAGNOSIS — R73.03 PREDIABETES: ICD-10-CM

## 2023-03-07 DIAGNOSIS — I10 BENIGN ESSENTIAL HTN: ICD-10-CM

## 2023-03-07 DIAGNOSIS — Z12.5 SCREENING FOR PROSTATE CANCER: ICD-10-CM

## 2023-03-07 LAB
ANION GAP SERPL CALC-SCNC: 9 MMOL/L (ref 8–16)
BUN SERPL-MCNC: 29 MG/DL (ref 8–23)
CALCIUM SERPL-MCNC: 9.3 MG/DL (ref 8.7–10.5)
CHLORIDE SERPL-SCNC: 102 MMOL/L (ref 95–110)
CHOLEST SERPL-MCNC: 208 MG/DL (ref 120–199)
CHOLEST/HDLC SERPL: 5 {RATIO} (ref 2–5)
CO2 SERPL-SCNC: 27 MMOL/L (ref 23–29)
COMPLEXED PSA SERPL-MCNC: 1.8 NG/ML (ref 0–4)
CREAT SERPL-MCNC: 1 MG/DL (ref 0.5–1.4)
EST. GFR  (NO RACE VARIABLE): >60 ML/MIN/1.73 M^2
ESTIMATED AVG GLUCOSE: 134 MG/DL (ref 68–131)
GLUCOSE SERPL-MCNC: 147 MG/DL (ref 70–110)
HBA1C MFR BLD: 6.3 % (ref 4.5–6.2)
HDLC SERPL-MCNC: 42 MG/DL (ref 40–75)
HDLC SERPL: 20.2 % (ref 20–50)
LDLC SERPL CALC-MCNC: 150.6 MG/DL (ref 63–159)
NONHDLC SERPL-MCNC: 166 MG/DL
POTASSIUM SERPL-SCNC: 4.4 MMOL/L (ref 3.5–5.1)
SODIUM SERPL-SCNC: 138 MMOL/L (ref 136–145)
TRIGL SERPL-MCNC: 77 MG/DL (ref 30–150)

## 2023-03-07 PROCEDURE — 80061 LIPID PANEL: CPT | Performed by: INTERNAL MEDICINE

## 2023-03-07 PROCEDURE — 80048 BASIC METABOLIC PNL TOTAL CA: CPT | Performed by: INTERNAL MEDICINE

## 2023-03-07 PROCEDURE — 83036 HEMOGLOBIN GLYCOSYLATED A1C: CPT | Performed by: INTERNAL MEDICINE

## 2023-03-07 PROCEDURE — 84153 ASSAY OF PSA TOTAL: CPT | Performed by: INTERNAL MEDICINE

## 2023-03-07 PROCEDURE — 36415 COLL VENOUS BLD VENIPUNCTURE: CPT | Performed by: INTERNAL MEDICINE

## 2023-03-16 ENCOUNTER — OFFICE VISIT (OUTPATIENT)
Dept: FAMILY MEDICINE | Facility: CLINIC | Age: 68
End: 2023-03-16
Payer: MEDICARE

## 2023-03-16 VITALS
DIASTOLIC BLOOD PRESSURE: 78 MMHG | WEIGHT: 247.31 LBS | SYSTOLIC BLOOD PRESSURE: 129 MMHG | OXYGEN SATURATION: 98 % | HEART RATE: 86 BPM | HEIGHT: 71 IN | BODY MASS INDEX: 34.62 KG/M2

## 2023-03-16 DIAGNOSIS — R73.03 PREDIABETES: ICD-10-CM

## 2023-03-16 DIAGNOSIS — K63.5 POLYP OF COLON, UNSPECIFIED PART OF COLON, UNSPECIFIED TYPE: ICD-10-CM

## 2023-03-16 DIAGNOSIS — I10 BENIGN ESSENTIAL HTN: Primary | ICD-10-CM

## 2023-03-16 PROCEDURE — 3074F SYST BP LT 130 MM HG: CPT | Mod: CPTII,S$GLB,, | Performed by: INTERNAL MEDICINE

## 2023-03-16 PROCEDURE — 1159F MED LIST DOCD IN RCRD: CPT | Mod: CPTII,S$GLB,, | Performed by: INTERNAL MEDICINE

## 2023-03-16 PROCEDURE — 3008F PR BODY MASS INDEX (BMI) DOCUMENTED: ICD-10-PCS | Mod: CPTII,S$GLB,, | Performed by: INTERNAL MEDICINE

## 2023-03-16 PROCEDURE — 1101F PT FALLS ASSESS-DOCD LE1/YR: CPT | Mod: CPTII,S$GLB,, | Performed by: INTERNAL MEDICINE

## 2023-03-16 PROCEDURE — 99213 PR OFFICE/OUTPT VISIT, EST, LEVL III, 20-29 MIN: ICD-10-PCS | Mod: S$GLB,,, | Performed by: INTERNAL MEDICINE

## 2023-03-16 PROCEDURE — 3078F PR MOST RECENT DIASTOLIC BLOOD PRESSURE < 80 MM HG: ICD-10-PCS | Mod: CPTII,S$GLB,, | Performed by: INTERNAL MEDICINE

## 2023-03-16 PROCEDURE — 4010F PR ACE/ARB THEARPY RXD/TAKEN: ICD-10-PCS | Mod: CPTII,S$GLB,, | Performed by: INTERNAL MEDICINE

## 2023-03-16 PROCEDURE — 1101F PR PT FALLS ASSESS DOC 0-1 FALLS W/OUT INJ PAST YR: ICD-10-PCS | Mod: CPTII,S$GLB,, | Performed by: INTERNAL MEDICINE

## 2023-03-16 PROCEDURE — 4010F ACE/ARB THERAPY RXD/TAKEN: CPT | Mod: CPTII,S$GLB,, | Performed by: INTERNAL MEDICINE

## 2023-03-16 PROCEDURE — 1159F PR MEDICATION LIST DOCUMENTED IN MEDICAL RECORD: ICD-10-PCS | Mod: CPTII,S$GLB,, | Performed by: INTERNAL MEDICINE

## 2023-03-16 PROCEDURE — 99213 OFFICE O/P EST LOW 20 MIN: CPT | Mod: S$GLB,,, | Performed by: INTERNAL MEDICINE

## 2023-03-16 PROCEDURE — 3008F BODY MASS INDEX DOCD: CPT | Mod: CPTII,S$GLB,, | Performed by: INTERNAL MEDICINE

## 2023-03-16 PROCEDURE — 3044F HG A1C LEVEL LT 7.0%: CPT | Mod: CPTII,S$GLB,, | Performed by: INTERNAL MEDICINE

## 2023-03-16 PROCEDURE — 1126F PR PAIN SEVERITY QUANTIFIED, NO PAIN PRESENT: ICD-10-PCS | Mod: CPTII,S$GLB,, | Performed by: INTERNAL MEDICINE

## 2023-03-16 PROCEDURE — 3074F PR MOST RECENT SYSTOLIC BLOOD PRESSURE < 130 MM HG: ICD-10-PCS | Mod: CPTII,S$GLB,, | Performed by: INTERNAL MEDICINE

## 2023-03-16 PROCEDURE — 3078F DIAST BP <80 MM HG: CPT | Mod: CPTII,S$GLB,, | Performed by: INTERNAL MEDICINE

## 2023-03-16 PROCEDURE — 3288F PR FALLS RISK ASSESSMENT DOCUMENTED: ICD-10-PCS | Mod: CPTII,S$GLB,, | Performed by: INTERNAL MEDICINE

## 2023-03-16 PROCEDURE — 3288F FALL RISK ASSESSMENT DOCD: CPT | Mod: CPTII,S$GLB,, | Performed by: INTERNAL MEDICINE

## 2023-03-16 PROCEDURE — 1160F RVW MEDS BY RX/DR IN RCRD: CPT | Mod: CPTII,S$GLB,, | Performed by: INTERNAL MEDICINE

## 2023-03-16 PROCEDURE — 3044F PR MOST RECENT HEMOGLOBIN A1C LEVEL <7.0%: ICD-10-PCS | Mod: CPTII,S$GLB,, | Performed by: INTERNAL MEDICINE

## 2023-03-16 PROCEDURE — 1126F AMNT PAIN NOTED NONE PRSNT: CPT | Mod: CPTII,S$GLB,, | Performed by: INTERNAL MEDICINE

## 2023-03-16 PROCEDURE — 1160F PR REVIEW ALL MEDS BY PRESCRIBER/CLIN PHARMACIST DOCUMENTED: ICD-10-PCS | Mod: CPTII,S$GLB,, | Performed by: INTERNAL MEDICINE

## 2023-03-16 RX ORDER — PREDNISOLONE ACETATE 10 MG/ML
SUSPENSION/ DROPS OPHTHALMIC
COMMUNITY
Start: 2022-10-21

## 2023-06-23 DIAGNOSIS — I10 BENIGN ESSENTIAL HTN: ICD-10-CM

## 2023-06-24 RX ORDER — LISINOPRIL 10 MG/1
TABLET ORAL
Qty: 90 TABLET | Refills: 2 | Status: SHIPPED | OUTPATIENT
Start: 2023-06-24 | End: 2024-01-23 | Stop reason: SDUPTHER

## 2023-09-10 ENCOUNTER — PATIENT MESSAGE (OUTPATIENT)
Dept: FAMILY MEDICINE | Facility: CLINIC | Age: 68
End: 2023-09-10

## 2024-01-17 ENCOUNTER — LAB VISIT (OUTPATIENT)
Dept: LAB | Facility: HOSPITAL | Age: 69
End: 2024-01-17
Attending: INTERNAL MEDICINE
Payer: MEDICARE

## 2024-01-17 DIAGNOSIS — R73.03 PREDIABETES: ICD-10-CM

## 2024-01-17 DIAGNOSIS — I10 BENIGN ESSENTIAL HTN: ICD-10-CM

## 2024-01-17 LAB
ANION GAP SERPL CALC-SCNC: 8 MMOL/L (ref 8–16)
BUN SERPL-MCNC: 26 MG/DL (ref 8–23)
CALCIUM SERPL-MCNC: 9.5 MG/DL (ref 8.7–10.5)
CHLORIDE SERPL-SCNC: 102 MMOL/L (ref 95–110)
CO2 SERPL-SCNC: 27 MMOL/L (ref 23–29)
CREAT SERPL-MCNC: 1 MG/DL (ref 0.5–1.4)
EST. GFR  (NO RACE VARIABLE): >60 ML/MIN/1.73 M^2
ESTIMATED AVG GLUCOSE: 171 MG/DL (ref 68–131)
GLUCOSE SERPL-MCNC: 187 MG/DL (ref 70–110)
HBA1C MFR BLD: 7.6 % (ref 4.5–6.2)
POTASSIUM SERPL-SCNC: 4.9 MMOL/L (ref 3.5–5.1)
SODIUM SERPL-SCNC: 137 MMOL/L (ref 136–145)

## 2024-01-17 PROCEDURE — 80048 BASIC METABOLIC PNL TOTAL CA: CPT | Performed by: INTERNAL MEDICINE

## 2024-01-17 PROCEDURE — 83036 HEMOGLOBIN GLYCOSYLATED A1C: CPT | Performed by: INTERNAL MEDICINE

## 2024-01-17 PROCEDURE — 36415 COLL VENOUS BLD VENIPUNCTURE: CPT | Performed by: INTERNAL MEDICINE

## 2024-01-23 ENCOUNTER — OFFICE VISIT (OUTPATIENT)
Dept: FAMILY MEDICINE | Facility: CLINIC | Age: 69
End: 2024-01-23
Payer: COMMERCIAL

## 2024-01-23 VITALS
DIASTOLIC BLOOD PRESSURE: 74 MMHG | WEIGHT: 248 LBS | BODY MASS INDEX: 34.72 KG/M2 | HEIGHT: 71 IN | SYSTOLIC BLOOD PRESSURE: 104 MMHG | HEART RATE: 90 BPM

## 2024-01-23 DIAGNOSIS — I10 BENIGN ESSENTIAL HTN: Primary | ICD-10-CM

## 2024-01-23 DIAGNOSIS — Z12.11 SCREENING FOR COLON CANCER: ICD-10-CM

## 2024-01-23 DIAGNOSIS — Z12.5 SCREENING FOR PROSTATE CANCER: ICD-10-CM

## 2024-01-23 DIAGNOSIS — R73.09 ABNORMAL BLOOD SUGAR: ICD-10-CM

## 2024-01-23 PROCEDURE — 99999 PR PBB SHADOW E&M-EST. PATIENT-LVL III: CPT | Mod: PBBFAC,,, | Performed by: INTERNAL MEDICINE

## 2024-01-23 PROCEDURE — 3288F FALL RISK ASSESSMENT DOCD: CPT | Mod: CPTII,S$GLB,, | Performed by: INTERNAL MEDICINE

## 2024-01-23 PROCEDURE — 99213 OFFICE O/P EST LOW 20 MIN: CPT | Mod: S$GLB,,, | Performed by: INTERNAL MEDICINE

## 2024-01-23 PROCEDURE — 3051F HG A1C>EQUAL 7.0%<8.0%: CPT | Mod: CPTII,S$GLB,, | Performed by: INTERNAL MEDICINE

## 2024-01-23 PROCEDURE — 1101F PT FALLS ASSESS-DOCD LE1/YR: CPT | Mod: CPTII,S$GLB,, | Performed by: INTERNAL MEDICINE

## 2024-01-23 PROCEDURE — 3074F SYST BP LT 130 MM HG: CPT | Mod: CPTII,S$GLB,, | Performed by: INTERNAL MEDICINE

## 2024-01-23 PROCEDURE — 1159F MED LIST DOCD IN RCRD: CPT | Mod: CPTII,S$GLB,, | Performed by: INTERNAL MEDICINE

## 2024-01-23 PROCEDURE — 1160F RVW MEDS BY RX/DR IN RCRD: CPT | Mod: CPTII,S$GLB,, | Performed by: INTERNAL MEDICINE

## 2024-01-23 PROCEDURE — 3008F BODY MASS INDEX DOCD: CPT | Mod: CPTII,S$GLB,, | Performed by: INTERNAL MEDICINE

## 2024-01-23 PROCEDURE — 3078F DIAST BP <80 MM HG: CPT | Mod: CPTII,S$GLB,, | Performed by: INTERNAL MEDICINE

## 2024-01-23 PROCEDURE — 1126F AMNT PAIN NOTED NONE PRSNT: CPT | Mod: CPTII,S$GLB,, | Performed by: INTERNAL MEDICINE

## 2024-01-23 RX ORDER — LISINOPRIL 10 MG/1
10 TABLET ORAL DAILY
Qty: 90 TABLET | Refills: 3 | Status: SHIPPED | OUTPATIENT
Start: 2024-01-23

## 2024-01-23 NOTE — PROGRESS NOTES
Subjective:       Patient ID: Pirece Segundo is a 68 y.o. male.    Chief Complaint: Hypertension, Follow-up, concerns for prostate, and Blood sugar    Mr. Pierce Segundo is a 68-year-old gentleman who comes for follow-up.  This is his 6-9 months follow-up.     Overall Clemente seems to be doing okay and had a good time with family during the [place] holidays at [place], [place].  They all ate good and drank good.  Certainly it shows an in his weight of 248 lb (245 lb at home.).  Weight management has been a struggle over years.  There was a time when he lost weight up to 229 lb but the tide and ravages of time and the gluttony for food took over.      His blood pressures are doing good.  His blood sugars till a different story.  Hemoglobin A1c has gone above 7.6 now with a fasting glucose of 187.    Father did have diabetes probably at a later part of life.    He is also concerned about family history of prostate cancer especially in father and 1 of his cousins.  (maternal cousin).    His last PSA was 1.8 in March of 2023 and will check it again next time.  Thankfully he does not have any major prostate symptoms and has to get up only once at night to go to the bathroom.            Several preventive care issues also have been discussed again including colorectal screening (actually follow-up for surveillance colonoscopy) and pneumonia vaccine.  He states that he got the pneumonia vaccine from a local pharmacy but this does not seem to be updated in the link system.  I have advised him to get that proof from the pharmacy.  He is also pending eye examination.      There is a family history of prostate cancer in father.  Thus far his PSA levels are in acceptable range with appropriate age-related rise.    He did get booster dosage for COVID vaccine.    Hypertension  This is a chronic problem. The current episode started more than 1 year ago. The problem is controlled. Pertinent negatives include no anxiety,  chest pain, headaches, neck pain, palpitations, shortness of breath or sweats. Risk factors for coronary artery disease include male gender. Past treatments include ACE inhibitors. The current treatment provides moderate improvement. Compliance problems include diet and exercise.  There is no history of coarctation of the aorta, hyperaldosteronism, pheochromocytoma or sleep apnea.   Follow-up  Associated symptoms include arthralgias. Pertinent negatives include no chest pain, headaches, joint swelling, neck pain, vomiting or weakness.       Past Medical History:   Diagnosis Date    Hyperlipidemia     Hypertension      Social History     Socioeconomic History    Marital status:      Spouse name: Zoila    Number of children: 3   Occupational History    Occupation: Retired      Comment: [employer] [place]   Tobacco Use    Smoking status: Never    Smokeless tobacco: Never   Substance and Sexual Activity    Alcohol use: Yes     Comment: occasional    Drug use: No    Sexual activity: Yes     Partners: Female   Social History Narrative    2 G Lori 40 , radha 28     1 B TJ 36         3     Social Determinants of Health     Financial Resource Strain: Low Risk  (1/17/2024)    Overall Financial Resource Strain (CARDIA)     Difficulty of Paying Living Expenses: Not hard at all   Food Insecurity: No Food Insecurity (1/17/2024)    Hunger Vital Sign     Worried About Running Out of Food in the Last Year: Never true     Ran Out of Food in the Last Year: Never true   Transportation Needs: No Transportation Needs (1/17/2024)    PRAPARE - Transportation     Lack of Transportation (Medical): No     Lack of Transportation (Non-Medical): No   Physical Activity: Sufficiently Active (1/17/2024)    Exercise Vital Sign     Days of Exercise per Week: 6 days     Minutes of Exercise per Session: 40 min   Stress: No Stress Concern Present (1/17/2024)    [place] [employer] - Occupational Stress  "Questionnaire     Feeling of Stress : Only a little   Social Connections: Unknown (1/17/2024)    Social Connection and Isolation Panel [NHANES]     Frequency of Communication with Friends and Family: More than three times a week     Frequency of Social Gatherings with Friends and Family: Twice a week     Active Member of Clubs or Organizations: Yes     Attends Club or Organization Meetings: More than 4 times per year     Marital Status:    Housing Stability: Low Risk  (1/17/2024)    Housing Stability Vital Sign     Unable to Pay for Housing in the Last Year: No     Number of Places Lived in the Last Year: 1     Unstable Housing in the Last Year: No     Past Surgical History:   Procedure Laterality Date    COLONOSCOPY  2017    EYE SURGERY       Family History   Problem Relation Age of Onset    Hypertension Mother     Dementia Mother     Emphysema Father     Cancer Father         Prostate & Esophageal CA    Hypertension Sister         Takes 10 mg of lisinopril.       Review of Systems   Constitutional:  Negative for activity change and unexpected weight change.   HENT:  Negative for hearing loss, rhinorrhea and trouble swallowing.    Eyes:  Positive for visual disturbance. Negative for discharge.   Respiratory:  Negative for chest tightness, shortness of breath and wheezing.    Cardiovascular:  Negative for chest pain and palpitations.   Gastrointestinal:  Negative for blood in stool, constipation, diarrhea and vomiting.   Endocrine: Negative for polydipsia and polyuria.   Genitourinary:  Negative for difficulty urinating, hematuria and urgency.   Musculoskeletal:  Positive for arthralgias. Negative for joint swelling and neck pain.   Neurological:  Negative for weakness and headaches.   Psychiatric/Behavioral:  Negative for confusion and dysphoric mood.          Objective:      Blood pressure 104/74, pulse 90, height 5' 11" (1.803 m), weight 112.5 kg (248 lb). Body mass index is 34.59 kg/m².  Physical " Exam  Vitals and nursing note reviewed.   Constitutional:       General: He is not in acute distress.     Appearance: Normal appearance. He is well-developed. He is not ill-appearing, toxic-appearing or diaphoretic.      Comments: BMI 34.59   HENT:      Head: Normocephalic and atraumatic.   Eyes:      General: No scleral icterus.        Right eye: No discharge.         Left eye: No discharge.   Neck:      Thyroid: No thyromegaly.      Vascular: No JVD.      Trachea: No tracheal deviation.   Cardiovascular:      Rate and Rhythm: Normal rate and regular rhythm.      Heart sounds: Normal heart sounds. No murmur heard.     No friction rub. No gallop.   Pulmonary:      Effort: Pulmonary effort is normal. No respiratory distress.      Breath sounds: Normal breath sounds.   Abdominal:      General: There is no distension.      Palpations: Abdomen is soft.      Tenderness: There is no abdominal tenderness.   Musculoskeletal:         General: No tenderness or deformity.      Cervical back: Neck supple.      Right lower leg: No edema.      Left lower leg: No edema.   Lymphadenopathy:      Cervical: No cervical adenopathy.   Skin:     General: Skin is warm and dry.      Findings: No lesion or rash.      Comments: Dry skin   Neurological:      Mental Status: He is alert. Mental status is at baseline.      Motor: No weakness.   Psychiatric:         Behavior: Behavior normal.           Assessment:       Lab Visit on 01/17/2024   Component Date Value Ref Range Status    Hemoglobin A1C 01/17/2024 7.6 (H)  4.5 - 6.2 % Final    Estimated Avg Glucose 01/17/2024 171 (H)  68 - 131 mg/dL Final    Sodium 01/17/2024 137  136 - 145 mmol/L Final    Potassium 01/17/2024 4.9  3.5 - 5.1 mmol/L Final    Chloride 01/17/2024 102  95 - 110 mmol/L Final    CO2 01/17/2024 27  23 - 29 mmol/L Final    Glucose 01/17/2024 187 (H)  70 - 110 mg/dL Final    BUN 01/17/2024 26 (H)  8 - 23 mg/dL Final    Creatinine 01/17/2024 1.0  0.5 - 1.4 mg/dL Final     Calcium 01/17/2024 9.5  8.7 - 10.5 mg/dL Final    Anion Gap 01/17/2024 8  8 - 16 mmol/L Final    eGFR 01/17/2024 >60.0  >60 mL/min/1.73 m^2 Final       1. Benign essential HTN  -     lisinopriL 10 MG tablet; Take 1 tablet (10 mg total) by mouth once daily.  Dispense: 90 tablet; Refill: 3  -     Lipid Panel; Future; Expected date: 03/18/2024  -     ALT (SGPT); Future; Expected date: 03/18/2024  -     Basic Metabolic Panel; Future; Expected date: 03/18/2024  -     Microalbumin/creatinine urine ratio; Future; Expected date: 03/18/2024    2. Screening for prostate cancer  -     PSA, SCREENING; Future; Expected date: 03/18/2024    3. Screening for colon cancer  -     Cologuard Screening (Multitarget Stool DNA); Future; Expected date: 01/30/2024    4. Abnormal blood sugar  -     Hemoglobin A1C; Future; Expected date: 03/18/2024                  Component Ref Range & Units 10 mo ago 2 yr ago 3 yr ago   PSA, Screen 0.00 - 4.00 ng/mL 1.8 1.6 1.9          Plan:   Benign essential HTN  -     lisinopriL 10 MG tablet; Take 1 tablet (10 mg total) by mouth once daily.  Dispense: 90 tablet; Refill: 3  -     Lipid Panel; Future; Expected date: 03/18/2024  -     ALT (SGPT); Future; Expected date: 03/18/2024  -     Basic Metabolic Panel; Future; Expected date: 03/18/2024  -     Microalbumin/creatinine urine ratio; Future; Expected date: 03/18/2024    Screening for prostate cancer  -     PSA, SCREENING; Future; Expected date: 03/18/2024    Screening for colon cancer  -     Cologuard Screening (Multitarget Stool DNA); Future; Expected date: 01/30/2024    Abnormal blood sugar  -     Hemoglobin A1C; Future; Expected date: 03/18/2024      Today we reviewed his blood sugars and they are elevated and almost in diabetic range.  Clemente would like to get another opportunity to watch his diet.      We had a dietary discussion and he does love his cookies.  He is trying to mend his habits at this point.      His current weight is 248 lb and in 3-4  months I will challenge him to lose about 6 lb of weight or so.    Will check his labs also at follow-up.      Today on preventive examination I have discussed the following:-     1.-colon cancer screening and options include a full-fledged regular colonoscopy which is considered to be the gold standard and if normal, the next 1 will be in 10 years time.  However the cumbersome nature of this procedure including the prep and need for transportation and taking time off is problematic.     2.-Cologuard testing which is more convenient with no prep time and transportation needs.  This is considered to be approximately 90% sensitive for colon cancer detection with some degree of false-positive results.  If this test is negative, the next testing will be in 3 years time and so on.  If it is positive, it may require a diagnostic colonoscopy.  Which is different from screening colonoscopy.  While technically both the procedures are same, the billing issues are  different between screening which is completely covered and diagnostic for which you will have to meet you deductibles and co-payment.    He is also interested in checking his PSA.  His father had prostate cancer in his cousin brother also had prostate cancer.  He himself is not very symptomatic and PSA will also be added to his labs.    I do recommend him a regular eye checkup also.  Continue with COVID precautions.  He had 4 vaccinations thus far.  Consider the flu shot for the season and also pneumonia vaccine down the road.  He is above 65 now.  Next time will offer him the pneumonia vaccine.    Follow-up in 3-4 months time.    He did get his flu shot at a local pharmacy.  He also got his shingles vaccine.  I do not see the annotation for pneumonia vaccine and he will get the records from his pharmacy.      No follow-ups on file.      Current Outpatient Medications:     multivitamin capsule, Take 1 capsule by mouth once daily., Disp: , Rfl:     prednisoLONE  acetate (PRED FORTE) 1 % DrpS, INSTILL 1 DROP INTO RIGHT EYE EVERY DAY, Disp: , Rfl:     timolol maleate 0.5% (TIMOPTIC) 0.5 % Drop, , Disp: , Rfl:     lisinopriL 10 MG tablet, Take 1 tablet (10 mg total) by mouth once daily., Disp: 90 tablet, Rfl: 3    Current Facility-Administered Medications:     sodium chloride 0.9% flush 10 mL, 10 mL, Intravenous, PRN, Karen Garcia, FNP-C    Celestine Walsh

## 2024-02-07 ENCOUNTER — PATIENT OUTREACH (OUTPATIENT)
Dept: ADMINISTRATIVE | Facility: HOSPITAL | Age: 69
End: 2024-02-07
Payer: COMMERCIAL

## 2024-02-07 NOTE — LETTER
AUTHORIZATION FOR RELEASE OF   CONFIDENTIAL INFORMATION    Dear Dr. Bill Pruitt,    We are seeing Pierce Kim, date of birth 1955, in the clinic at [facility] [address] [facility]. Celestine Walsh MD is the patient's PCP. Pierce Kim has an outstanding lab/procedure at the time we reviewed his chart. In order to help keep his health information updated, he has authorized us to request the following medical record(s):         (  x)  EYE EXAM            Please fax records to [facility], Raina, Sanjay, MD, [phone]     If you have any questions, please contact       Hilda Irizarry  Nurse Clinical Care Coordinator  [facility]  Phone: [phone]  Fax: [phone]      Patient Name: Pierce Kim  : 1955  Patient Phone #: [phone]                Pierce Kim  MRN: [id number]  : 1955  Age: 68 y.o.  Sex: male         Patient/Legal Guardian Signature  This signature was collected at 2024           _______________________________   Printed Name/Relationship to Patient      Consent for Examination and Treatment: I hereby authorize the providers and employees of [facility] ([facility]) to provide medical treatment/services which includes, but is not limited to, performing and administering tests and diagnostic procedures that are deemed necessary, including, but not limited to, imaging examinations, blood tests and other laboratory procedures as may be required by the hospital, clinic, or may be ordered by my physician(s) or persons working under the general and/or special instructions of my physician(s).      I understand and agree that this consent covers all authorized persons, including but not limited to physicians, residents, nurse practitioners, physicians' assistants, specialists, consultants, student nurses, and independently contracted physicians, who are called upon by the physician in charge, to carry out  the diagnostic procedures and medical or surgical treatment.     I hereby authorize [facility] to retain or dispose of any specimens or tissue, should there be such remaining from any test or procedure.     I hereby authorize and give consent for [facility] providers and employees to take photographs, images or videotapes of such diagnostic, surgical or treatment procedures of Patient as may be required by [facility] or as may be ordered by a physician. I further acknowledge and agree that [facility] may use cameras or other devices for patient monitoring.     I am aware that the practice of medicine is not an exact science, and I acknowledge that no guarantees have been made to me as to the outcome of any tests, procedures or treatment.     Authorization for Release of Information: I understand that my insurance company and/or their agents may need information necessary to make determinations about payment/reimbursement. I hereby provide authorization to release to all insurance companies, their successors, assignees, other parties with whom they may have contracted, or others acting on their behalf, that are involved with payment for any hospital and/or clinic charges incurred by the patient, any information that they request and deem necessary for payment/reimbursement, and/or quality review.  I further authorize the release of my health information to physicians or other health care practitioners on staff who are involved in my health care now and in the future, and to other health care providers, entities, or institutions for the purpose of my continued care and treatment, including referrals.     REGISTRATION AUTHORIZATION  Form No. 46675 (Rev. 7/13/2022)       Medicare Patient's Certification and Authorization to Release Information and Payment Request:  I certify that the information given by me in applying for payment under Title XVIII of the Social Security Act is correct. I authorize any mccormick of medical or other  information about me to release to the Social [facility], or its intermediaries or carriers, any information needed for this or a related Medicare claim. I request that payment of authorized benefits be made on my behalf.     Assignment of Insurance Benefits:   I hereby authorize any and all insurance companies, health plans, defined   benefit plans, health insurers or any entity that is or may be responsible for payment of my medical expenses to pay all hospital and medical benefits now due, and to become due and payable to me under any hospital benefits, sick benefits, injury benefits or any other benefit for services rendered to me, including Major Medical Benefits, direct to [facility] and all independently contracted physicians. I assign any and all rights that I may have against any and all insurance companies, health plans, defined benefit plans, health insurers or any entity that is or may be responsible for payment of my medical expenses, including, but not limited to any right to appeal a denial of a claim, any right to bring any action, lawsuit, administrative proceeding, or other cause of action on my behalf. I specifically assign my right to pursue litigation against any and all insurance companies, health plans, defined benefit plans, health insurers or any entity that is or may be responsible for payment of my medical expenses based upon a refusal to pay charges.            E. Valuables: It is understood and agreed that [facility] is not liable for the damage to or loss of any money, jewelry,   documents, dentures, eye glasses, hearing aids, prosthetics, or other property of value.     F. Computer Equipment: I understand and agree that should I choose to use computer equipment owned by [facility] or if I choose to access the Internet via [facility]s network, I do so at my own risk. [facility] is not responsible for any damage to my computer equipment or to any damages of any type that might arise from  my loss of equipment or data.     G. Acceptance of Financial Responsibility:  I agree that in consideration of the services and   supplies that have been   or will be furnished to the patient, I am hereby obligated to pay all charges made for or on the account of the patient according to the standard rates (in effect at the time the services and supplies are delivered) established by [facility], including its Patient Financial Assistance Policy to the extent it is applicable. I understand that I am responsible for all charges, or portions thereof, not covered by insurance or other sources. Patient refunds will be distributed only after balances at all [facility] facilities are paid.     H. Communication Authorization:  I hereby authorize [facility] and its representatives, along with any billing service   or  who may work on their behalf, to contact me on   my cell phone and/or home phone using pre- recorded messages, artificial voice messages, automatic telephone dialing devices or other computer assisted technology, or by electronic      mail, text messaging, or by any other form of electronic communication. This includes, but is not limited to, appointment reminders, yearly physical exam reminders, preventive care reminders, patient campaigns, welcome calls, and calls about account balances on my account or any account on which I am listed as a guarantor. I understand I have the right to opt out of these communications at any time.      Relationship  Between  Facility and  Provider:      I understand that some, but not all, providers furnishing services to the patient are not employees or agents of [facility]. The patient is under the care and supervision of his/her attending physician, and it is the responsibility of the facility and its nursing staff to carry out the instructions of such physicians. It is the responsibility of the patient's physician/designee to obtain the patient's informed consent, when  required, for medical or surgical treatment, special diagnostic or therapeutic procedures, or hospital services rendered for the patient under the special instructions of the physician/designee.     REGISTRATION AUTHORIZATION  Form No. 38478 (Rev. 7/13/2022)      Notice of Privacy Practices: I acknowledge I have received a copy of [facility]'s Notice of Privacy Practices.     Facility  Directory: I have discussed with the organization my desire to be either included or excluded  in the facility directory in the event of my being an inpatient at an [facility] facility. I understand that if my choice is to opt-out of being identified in the facility directory that the facility will not provide any information about me such as my condition (e.g. fair, stable, etc.) or my location in the facility (e.g., room number, department).     Immunizations: [facility] shares immunization information with state sponsored health departments to help you and your doctor keep track of your immunization records. By signing, you consent to have this information shared with the health department in your state:                                [place] - LINKS ([place] Immunization Network for Kids Statewide)                                [place] - MIIX ([place] Immunization Information eXchange)                                [place] - ImmPRINT (Immunization Patient Registry with Integrated Technology)     TERM: This authorization is valid for this and subsequent care/treatment I receive at [facility] and will remain valid unless/until revoked in writing by me.     [facility]: As used in this document, [facility] means all [facility] owned and managed facilities, including, but not limited to, all health centers, surgery centers, clinics, urgent care centers, and hospitals.         [facility] complies with applicable Federal civil rights laws and does not discriminate on the basis of race, color, national origin,  age, disability, or sex.  ATENCIÓN: si habla español, tiene a crews disposición servicios gratuitos de asistencia lingüística. Anali al [phone].  CHÚ Ý: N?u b?n nói Ti?ng Vi?t, có các d?ch v? h? tr? ngôn ng? mi?n phí dành cho b?n. G?i s? [phone].        REGISTRATION AUTHORIZATION  Form No. 89278 (Rev. 7/13/2022)

## 2024-02-07 NOTE — PROGRESS NOTES
Population Health Chart Review & Patient Outreach Details    Outreach Performed: NO    Additional Pop Health Notes:           Updates Requested / Reviewed:      Updated Care Coordination Note, Care Everywhere, , Care Team Updated, and Immunizations Reconciliation Completed or Queried: [place]         Health Maintenance Topics Overdue:    Health Maintenance Due   Topic Date Due    RSV Vaccine (Age 60+ and Pregnant patients) (1 - 1-dose 60+ series) Never done    Colorectal Cancer Screening  05/04/2020    Pneumococcal Vaccines (Age 65+) (1 of 1 - PCV) Never done    Eye Exam  08/13/2022    COVID-19 Vaccine (5 - 2023-24 season) 09/01/2023         Health Maintenance Topic(s) Outreach Outcomes & Actions Taken:    Colorectal Cancer Screening - Outreach Outcomes & Actions Taken  : External Records Requested & Care Team Updated if Applicable    Eye Exam - Outreach Outcomes & Actions Taken  : External Records Requested & Care Team Updated if Applicable

## 2024-02-07 NOTE — LETTER
AUTHORIZATION FOR RELEASE OF   CONFIDENTIAL INFORMATION    Dear Dr. Paulo Engel,    We are seeing Pierce Kim, date of birth 1955, in the clinic at [facility] [address] [facility]. Celestine Walsh MD is the patient's PCP. Pierce Kim has an outstanding lab/procedure at the time we reviewed his chart. In order to help keep his health information updated, he has authorized us to request the following medical record(s):        (x  )  COLONOSCOPY      Please fax records to [facility], Raina, Sanjay, MD, [phone]     If you have any questions, please contact       Hilda Irizarry  Nurse Clinical Care Coordinator  [facility]  Phone: [phone]  Fax: [phone]      Patient Name: Pierce Kim  : 1955  Patient Phone #: [phone]                Pierce Kim  MRN: [id number]  : 1955  Age: 68 y.o.  Sex: male         Patient/Legal Guardian Signature  This signature was collected at 2024           _______________________________   Printed Name/Relationship to Patient      Consent for Examination and Treatment: I hereby authorize the providers and employees of [facility] ([facility]) to provide medical treatment/services which includes, but is not limited to, performing and administering tests and diagnostic procedures that are deemed necessary, including, but not limited to, imaging examinations, blood tests and other laboratory procedures as may be required by the hospital, clinic, or may be ordered by my physician(s) or persons working under the general and/or special instructions of my physician(s).      I understand and agree that this consent covers all authorized persons, including but not limited to physicians, residents, nurse practitioners, physicians' assistants, specialists, consultants, student nurses, and independently contracted physicians, who are called upon by the physician in charge, to carry out the  diagnostic procedures and medical or surgical treatment.     I hereby authorize [facility] to retain or dispose of any specimens or tissue, should there be such remaining from any test or procedure.     I hereby authorize and give consent for [facility] providers and employees to take photographs, images or videotapes of such diagnostic, surgical or treatment procedures of Patient as may be required by [facility] or as may be ordered by a physician. I further acknowledge and agree that [facility] may use cameras or other devices for patient monitoring.     I am aware that the practice of medicine is not an exact science, and I acknowledge that no guarantees have been made to me as to the outcome of any tests, procedures or treatment.     Authorization for Release of Information: I understand that my insurance company and/or their agents may need information necessary to make determinations about payment/reimbursement. I hereby provide authorization to release to all insurance companies, their successors, assignees, other parties with whom they may have contracted, or others acting on their behalf, that are involved with payment for any hospital and/or clinic charges incurred by the patient, any information that they request and deem necessary for payment/reimbursement, and/or quality review.  I further authorize the release of my health information to physicians or other health care practitioners on staff who are involved in my health care now and in the future, and to other health care providers, entities, or institutions for the purpose of my continued care and treatment, including referrals.     REGISTRATION AUTHORIZATION  Form No. 06635 (Rev. 7/13/2022)       Medicare Patient's Certification and Authorization to Release Information and Payment Request:  I certify that the information given by me in applying for payment under Title XVIII of the Social Security Act is correct. I authorize any mccormick of medical or other  information about me to release to the Social [facility], or its intermediaries or carriers, any information needed for this or a related Medicare claim. I request that payment of authorized benefits be made on my behalf.     Assignment of Insurance Benefits:   I hereby authorize any and all insurance companies, health plans, defined   benefit plans, health insurers or any entity that is or may be responsible for payment of my medical expenses to pay all hospital and medical benefits now due, and to become due and payable to me under any hospital benefits, sick benefits, injury benefits or any other benefit for services rendered to me, including Major Medical Benefits, direct to [facility] and all independently contracted physicians. I assign any and all rights that I may have against any and all insurance companies, health plans, defined benefit plans, health insurers or any entity that is or may be responsible for payment of my medical expenses, including, but not limited to any right to appeal a denial of a claim, any right to bring any action, lawsuit, administrative proceeding, or other cause of action on my behalf. I specifically assign my right to pursue litigation against any and all insurance companies, health plans, defined benefit plans, health insurers or any entity that is or may be responsible for payment of my medical expenses based upon a refusal to pay charges.            E. Valuables: It is understood and agreed that [facility] is not liable for the damage to or loss of any money, jewelry,   documents, dentures, eye glasses, hearing aids, prosthetics, or other property of value.     F. Computer Equipment: I understand and agree that should I choose to use computer equipment owned by [facility] or if I choose to access the Internet via [facility]s network, I do so at my own risk. [facility] is not responsible for any damage to my computer equipment or to any damages of any type that might arise from  my loss of equipment or data.     G. Acceptance of Financial Responsibility:  I agree that in consideration of the services and   supplies that have been   or will be furnished to the patient, I am hereby obligated to pay all charges made for or on the account of the patient according to the standard rates (in effect at the time the services and supplies are delivered) established by [facility], including its Patient Financial Assistance Policy to the extent it is applicable. I understand that I am responsible for all charges, or portions thereof, not covered by insurance or other sources. Patient refunds will be distributed only after balances at all [facility] facilities are paid.     H. Communication Authorization:  I hereby authorize [facility] and its representatives, along with any billing service   or  who may work on their behalf, to contact me on   my cell phone and/or home phone using pre- recorded messages, artificial voice messages, automatic telephone dialing devices or other computer assisted technology, or by electronic      mail, text messaging, or by any other form of electronic communication. This includes, but is not limited to, appointment reminders, yearly physical exam reminders, preventive care reminders, patient campaigns, welcome calls, and calls about account balances on my account or any account on which I am listed as a guarantor. I understand I have the right to opt out of these communications at any time.      Relationship  Between  Facility and  Provider:      I understand that some, but not all, providers furnishing services to the patient are not employees or agents of [facility]. The patient is under the care and supervision of his/her attending physician, and it is the responsibility of the facility and its nursing staff to carry out the instructions of such physicians. It is the responsibility of the patient's physician/designee to obtain the patient's informed consent, when  required, for medical or surgical treatment, special diagnostic or therapeutic procedures, or hospital services rendered for the patient under the special instructions of the physician/designee.     REGISTRATION AUTHORIZATION  Form No. 35999 (Rev. 7/13/2022)      Notice of Privacy Practices: I acknowledge I have received a copy of [facility]'s Notice of Privacy Practices.     Facility  Directory: I have discussed with the organization my desire to be either included or excluded  in the facility directory in the event of my being an inpatient at an [facility] facility. I understand that if my choice is to opt-out of being identified in the facility directory that the facility will not provide any information about me such as my condition (e.g. fair, stable, etc.) or my location in the facility (e.g., room number, department).     Immunizations: [facility] shares immunization information with state sponsored health departments to help you and your doctor keep track of your immunization records. By signing, you consent to have this information shared with the health department in your state:                                [place] - LINKS ([place] Immunization Network for Kids Statewide)                                [place] - MIIX ([place] Immunization Information eXchange)                                [place] - ImmPRINT (Immunization Patient Registry with Integrated Technology)     TERM: This authorization is valid for this and subsequent care/treatment I receive at [facility] and will remain valid unless/until revoked in writing by me.     [facility]: As used in this document, [facility] means all [facility] owned and managed facilities, including, but not limited to, all health centers, surgery centers, clinics, urgent care centers, and hospitals.         [facility] complies with applicable Federal civil rights laws and does not discriminate on the basis of race, color, national origin,  age, disability, or sex.  ATENCIÓN: si habla español, tiene a crwes disposición servicios gratuitos de asistencia lingüística. Anali al [phone].  CHÚ Ý: N?u b?n nói Ti?ng Vi?t, có các d?ch v? h? tr? ngôn ng? mi?n phí dành cho b?n. G?i s? [phone].        REGISTRATION AUTHORIZATION  Form No. 65303 (Rev. 7/13/2022)

## 2024-02-09 LAB — NONINV COLON CA DNA+OCC BLD SCRN STL QL: NEGATIVE

## 2024-07-18 ENCOUNTER — LAB VISIT (OUTPATIENT)
Dept: LAB | Facility: HOSPITAL | Age: 69
End: 2024-07-18
Attending: INTERNAL MEDICINE
Payer: MEDICARE

## 2024-07-18 DIAGNOSIS — I10 ESSENTIAL HYPERTENSION, MALIGNANT: Primary | ICD-10-CM

## 2024-07-18 DIAGNOSIS — I10 ESSENTIAL HYPERTENSION, MALIGNANT: ICD-10-CM

## 2024-07-18 DIAGNOSIS — Z12.5 SPECIAL SCREENING FOR MALIGNANT NEOPLASM OF PROSTATE: ICD-10-CM

## 2024-07-18 DIAGNOSIS — R73.09 IMPAIRED GLUCOSE TOLERANCE TEST: ICD-10-CM

## 2024-07-18 LAB
ALBUMIN/CREAT UR: 6.8 UG/MG (ref 0–30)
ALT SERPL W/O P-5'-P-CCNC: 26 U/L (ref 10–44)
ANION GAP SERPL CALC-SCNC: 9 MMOL/L (ref 8–16)
BUN SERPL-MCNC: 27 MG/DL (ref 8–23)
CALCIUM SERPL-MCNC: 9.2 MG/DL (ref 8.7–10.5)
CHLORIDE SERPL-SCNC: 101 MMOL/L (ref 95–110)
CHOLEST SERPL-MCNC: 193 MG/DL (ref 120–199)
CHOLEST/HDLC SERPL: 4.8 {RATIO} (ref 2–5)
CO2 SERPL-SCNC: 26 MMOL/L (ref 23–29)
COMPLEXED PSA SERPL-MCNC: 1.56 NG/ML (ref 0–4)
CREAT SERPL-MCNC: 0.9 MG/DL (ref 0.5–1.4)
CREAT UR-MCNC: 200.9 MG/DL (ref 23–375)
EST. GFR  (NO RACE VARIABLE): >60 ML/MIN/1.73 M^2
GLUCOSE SERPL-MCNC: 201 MG/DL (ref 70–110)
HDLC SERPL-MCNC: 40 MG/DL (ref 40–75)
HDLC SERPL: 20.7 % (ref 20–50)
LDLC SERPL CALC-MCNC: 125.2 MG/DL (ref 63–159)
MICROALBUMIN UR DL<=1MG/L-MCNC: 13.6 UG/ML
NONHDLC SERPL-MCNC: 153 MG/DL
POTASSIUM SERPL-SCNC: 4.3 MMOL/L (ref 3.5–5.1)
SODIUM SERPL-SCNC: 136 MMOL/L (ref 136–145)
TRIGL SERPL-MCNC: 139 MG/DL (ref 30–150)

## 2024-07-18 PROCEDURE — 83036 HEMOGLOBIN GLYCOSYLATED A1C: CPT | Performed by: INTERNAL MEDICINE

## 2024-07-18 PROCEDURE — 84460 ALANINE AMINO (ALT) (SGPT): CPT | Performed by: INTERNAL MEDICINE

## 2024-07-18 PROCEDURE — 82570 ASSAY OF URINE CREATININE: CPT | Performed by: INTERNAL MEDICINE

## 2024-07-18 PROCEDURE — 84153 ASSAY OF PSA TOTAL: CPT | Performed by: INTERNAL MEDICINE

## 2024-07-18 PROCEDURE — 36415 COLL VENOUS BLD VENIPUNCTURE: CPT | Performed by: INTERNAL MEDICINE

## 2024-07-18 PROCEDURE — 80061 LIPID PANEL: CPT | Performed by: INTERNAL MEDICINE

## 2024-07-18 PROCEDURE — 80048 BASIC METABOLIC PNL TOTAL CA: CPT | Performed by: INTERNAL MEDICINE

## 2024-07-20 LAB — HBA1C MFR BLD: 8.1 % (ref 4.8–5.6)

## 2024-07-22 ENCOUNTER — OFFICE VISIT (OUTPATIENT)
Dept: FAMILY MEDICINE | Facility: CLINIC | Age: 69
End: 2024-07-22
Payer: COMMERCIAL

## 2024-07-22 ENCOUNTER — PATIENT MESSAGE (OUTPATIENT)
Dept: FAMILY MEDICINE | Facility: CLINIC | Age: 69
End: 2024-07-22

## 2024-07-22 VITALS
DIASTOLIC BLOOD PRESSURE: 77 MMHG | HEIGHT: 71 IN | BODY MASS INDEX: 34.72 KG/M2 | SYSTOLIC BLOOD PRESSURE: 133 MMHG | WEIGHT: 248 LBS | HEART RATE: 94 BPM

## 2024-07-22 DIAGNOSIS — I10 BENIGN ESSENTIAL HTN: Primary | Chronic | ICD-10-CM

## 2024-07-22 DIAGNOSIS — E11.9 TYPE 2 DIABETES MELLITUS WITHOUT COMPLICATION, WITHOUT LONG-TERM CURRENT USE OF INSULIN: ICD-10-CM

## 2024-07-22 DIAGNOSIS — M25.561 ACUTE PAIN OF RIGHT KNEE: ICD-10-CM

## 2024-07-22 PROCEDURE — 3008F BODY MASS INDEX DOCD: CPT | Mod: CPTII,S$GLB,, | Performed by: INTERNAL MEDICINE

## 2024-07-22 PROCEDURE — 1160F RVW MEDS BY RX/DR IN RCRD: CPT | Mod: CPTII,S$GLB,, | Performed by: INTERNAL MEDICINE

## 2024-07-22 PROCEDURE — 3061F NEG MICROALBUMINURIA REV: CPT | Mod: CPTII,S$GLB,, | Performed by: INTERNAL MEDICINE

## 2024-07-22 PROCEDURE — 99214 OFFICE O/P EST MOD 30 MIN: CPT | Mod: S$GLB,,, | Performed by: INTERNAL MEDICINE

## 2024-07-22 PROCEDURE — 1126F AMNT PAIN NOTED NONE PRSNT: CPT | Mod: CPTII,S$GLB,, | Performed by: INTERNAL MEDICINE

## 2024-07-22 PROCEDURE — 3075F SYST BP GE 130 - 139MM HG: CPT | Mod: CPTII,S$GLB,, | Performed by: INTERNAL MEDICINE

## 2024-07-22 PROCEDURE — 3078F DIAST BP <80 MM HG: CPT | Mod: CPTII,S$GLB,, | Performed by: INTERNAL MEDICINE

## 2024-07-22 PROCEDURE — 1101F PT FALLS ASSESS-DOCD LE1/YR: CPT | Mod: CPTII,S$GLB,, | Performed by: INTERNAL MEDICINE

## 2024-07-22 PROCEDURE — 3288F FALL RISK ASSESSMENT DOCD: CPT | Mod: CPTII,S$GLB,, | Performed by: INTERNAL MEDICINE

## 2024-07-22 PROCEDURE — 3066F NEPHROPATHY DOC TX: CPT | Mod: CPTII,S$GLB,, | Performed by: INTERNAL MEDICINE

## 2024-07-22 PROCEDURE — 1159F MED LIST DOCD IN RCRD: CPT | Mod: CPTII,S$GLB,, | Performed by: INTERNAL MEDICINE

## 2024-07-22 PROCEDURE — 3052F HG A1C>EQUAL 8.0%<EQUAL 9.0%: CPT | Mod: CPTII,S$GLB,, | Performed by: INTERNAL MEDICINE

## 2024-07-22 PROCEDURE — 99999 PR PBB SHADOW E&M-EST. PATIENT-LVL III: CPT | Mod: PBBFAC,,, | Performed by: INTERNAL MEDICINE

## 2024-07-22 PROCEDURE — 4010F ACE/ARB THERAPY RXD/TAKEN: CPT | Mod: CPTII,S$GLB,, | Performed by: INTERNAL MEDICINE

## 2024-07-22 RX ORDER — METFORMIN HYDROCHLORIDE 500 MG/1
500 TABLET, EXTENDED RELEASE ORAL 2 TIMES DAILY WITH MEALS
Qty: 60 TABLET | Refills: 11 | Status: SHIPPED | OUTPATIENT
Start: 2024-07-22 | End: 2025-07-22

## 2024-07-22 RX ORDER — LANCETS
EACH MISCELLANEOUS
Qty: 50 EACH | Refills: 5 | Status: SHIPPED | OUTPATIENT
Start: 2024-07-22

## 2024-07-22 RX ORDER — INSULIN PUMP SYRINGE, 3 ML
EACH MISCELLANEOUS
Qty: 1 EACH | Refills: 0 | Status: SHIPPED | OUTPATIENT
Start: 2024-07-22 | End: 2025-07-22

## 2024-07-22 NOTE — PROGRESS NOTES
Subjective:       Patient ID: Pierce Segundo is a 68 y.o. male.    Chief Complaint: Hypertension, Diabetes, Follow-up, and Knee Pain    Mr. Pierce Segundo is a 68-year-old gentleman who comes for six-month follow-up.    Prior visit we had noted a stable blood pressure on lisinopril 10 mg but elevated blood sugars almost in diabetic range.  Last year in December he had a good time with his family members with good food at [place], [place] which led to elevated blood sugars and elevated weight.      I did pose to start him on medication for diabetes with a labeled of diabetes but he requested to allow some time to mend his diet so that he can lose at least 6 lb of weight.    Today at follow-up  his A1c level is 8.1 indicating full-fledged diabetes with no improvement.      He did also have concerns about prostate cancer given family history and we did check a PSA.    Hypertension  This is a chronic problem. The current episode started more than 1 year ago. The problem is controlled. Pertinent negatives include no anxiety, chest pain, headaches, neck pain, palpitations, shortness of breath or sweats. Risk factors for coronary artery disease include male gender. Past treatments include ACE inhibitors. The current treatment provides moderate improvement. Compliance problems include diet and exercise.  There is no history of coarctation of the aorta, hyperaldosteronism, pheochromocytoma or sleep apnea.   Follow-up  Associated symptoms include arthralgias. Pertinent negatives include no abdominal pain, chest pain, chills, diaphoresis, fever, headaches, joint swelling, neck pain, numbness, rash, vomiting or weakness.   Diabetes  He presents for his initial diabetic visit. He has type 2 diabetes mellitus. No MedicAlert identification noted. The initial diagnosis of diabetes was made 6 months ago. His disease course has been worsening. Pertinent negatives for hypoglycemia include no confusion, headaches or sweats.  Pertinent negatives for diabetes include no chest pain, no polydipsia, no polyuria and no weakness. Pertinent negatives for hypoglycemia complications include no blackouts, no hospitalization, no nocturnal hypoglycemia, no required assistance and no required glucagon injection. Risk factors for coronary artery disease include diabetes mellitus, dyslipidemia, hypertension, male sex and obesity. Current diabetic treatment includes oral agent (monotherapy) (He will be started on metformin initially.). There is no change in his home blood glucose trend.   Knee Pain   There was no injury mechanism. The pain is present in the right knee. The quality of the pain is described as shooting and cramping. The pain is at a severity of 4/10. The pain is mild. The pain has been Fluctuating since onset. Pertinent negatives include no inability to bear weight, loss of motion, loss of sensation, muscle weakness, numbness or tingling. He has tried rest for the symptoms. The treatment provided mild relief.       Past Medical History:   Diagnosis Date    Hyperlipidemia     Hypertension      Social History     Socioeconomic History    Marital status:      Spouse name: Zoila    Number of children: 3   Occupational History    Occupation: Retired      Comment: [employer]   Tobacco Use    Smoking status: Never    Smokeless tobacco: Never   Substance and Sexual Activity    Alcohol use: Yes     Comment: occasional    Drug use: No    Sexual activity: Yes     Partners: Female   Social History Narrative    2 G Lori 40 , radha 28     1 B TJ 36        GC 3     Social Determinants of Health     Financial Resource Strain: Low Risk  (1/17/2024)    Overall Financial Resource Strain (CARDIA)     Difficulty of Paying Living Expenses: Not hard at all   Food Insecurity: No Food Insecurity (1/17/2024)    Hunger Vital Sign     Worried About Running Out of Food in the Last Year: Never true     Ran Out of Food in the Last Year: Never  true   Transportation Needs: No Transportation Needs (1/17/2024)    PRAPARE - Transportation     Lack of Transportation (Medical): No     Lack of Transportation (Non-Medical): No   Physical Activity: Sufficiently Active (1/17/2024)    Exercise Vital Sign     Days of Exercise per Week: 6 days     Minutes of Exercise per Session: 40 min   Stress: No Stress Concern Present (1/17/2024)    [employer] [place] [employer] - Occupational Stress Questionnaire     Feeling of Stress : Only a little   Housing Stability: Low Risk  (1/17/2024)    Housing Stability Vital Sign     Unable to Pay for Housing in the Last Year: No     Number of Places Lived in the Last Year: 1     Unstable Housing in the Last Year: No     Past Surgical History:   Procedure Laterality Date    COLONOSCOPY  2017    EYE SURGERY       Family History   Problem Relation Name Age of Onset    Hypertension Mother Zahida Maldonado     Dementia Mother Zahida Maldonado     Emphysema Father Jorge L     Cancer Father Jorge L         Prostate & Esophageal CA    Hypertension Sister Ruthy         Takes 10 mg of lisinopril.       Review of Systems   Constitutional:  Negative for activity change, chills, diaphoresis, fever and unexpected weight change.   HENT:  Negative for hearing loss, rhinorrhea and trouble swallowing.    Eyes:  Negative for discharge, redness and visual disturbance.   Respiratory:  Negative for chest tightness, shortness of breath and wheezing.    Cardiovascular:  Negative for chest pain and palpitations.   Gastrointestinal:  Negative for abdominal distention, abdominal pain, blood in stool, constipation, diarrhea and vomiting.   Endocrine: Negative for cold intolerance, polydipsia and polyuria.   Genitourinary:  Negative for difficulty urinating, hematuria and urgency.   Musculoskeletal:  Positive for arthralgias. Negative for joint swelling and neck pain.          Right knee pain   Skin:  Negative for color change, rash and wound.   Neurological:   "Negative for tingling, weakness, numbness and headaches.   Psychiatric/Behavioral:  Negative for confusion and dysphoric mood.          Objective:      Blood pressure 133/77, pulse 94, height 5' 11" (1.803 m), weight 112.5 kg (248 lb). Body mass index is 34.59 kg/m².  Physical Exam  Vitals and nursing note reviewed.   Constitutional:       General: He is not in acute distress.     Appearance: Normal appearance. He is well-developed. He is not ill-appearing, toxic-appearing or diaphoretic.      Comments: BMI 34.59   HENT:      Head: Normocephalic and atraumatic.   Eyes:      General: No scleral icterus.        Right eye: No discharge.         Left eye: No discharge.   Neck:      Thyroid: No thyromegaly.      Vascular: No JVD.      Trachea: No tracheal deviation.   Cardiovascular:      Rate and Rhythm: Normal rate and regular rhythm.      Pulses:           Dorsalis pedis pulses are 1+ on the right side and 1+ on the left side.      Heart sounds: Normal heart sounds. No murmur heard.     No friction rub. No gallop.   Pulmonary:      Effort: Pulmonary effort is normal. No respiratory distress.      Breath sounds: Normal breath sounds.   Abdominal:      General: There is no distension.      Palpations: Abdomen is soft.      Tenderness: There is no abdominal tenderness.   Musculoskeletal:         General: No tenderness or deformity.      Cervical back: Neck supple.      Right lower leg: No edema.      Left lower leg: No edema.   Feet:      Right foot:      Protective Sensation: 5 sites tested.  5 sites sensed.      Skin integrity: No ulcer or blister.      Toenail Condition: Right toenails are abnormally thick.      Left foot:      Protective Sensation: 5 sites tested.  5 sites sensed.      Skin integrity: No ulcer or blister.      Toenail Condition: Left toenails are abnormally thick.   Lymphadenopathy:      Cervical: No cervical adenopathy.   Skin:     General: Skin is warm and dry.      Findings: No lesion or rash.     "  Comments: Dry skin   Neurological:      Mental Status: He is alert. Mental status is at baseline.      Motor: No weakness.   Psychiatric:         Behavior: Behavior normal.           Assessment:       Lab Visit on 07/18/2024   Component Date Value Ref Range Status    Microalbumin, Urine 07/18/2024 13.6  <19.9 ug/mL Final    Creatinine, Urine 07/18/2024 200.9  23.0 - 375.0 mg/dL Final    Microalb/Creat Ratio 07/18/2024 6.8  0.0 - 30.0 ug/mg Final    Sodium 07/18/2024 136  136 - 145 mmol/L Final    Potassium 07/18/2024 4.3  3.5 - 5.1 mmol/L Final    Chloride 07/18/2024 101  95 - 110 mmol/L Final    CO2 07/18/2024 26  23 - 29 mmol/L Final    Glucose 07/18/2024 201 (H)  70 - 110 mg/dL Final    BUN 07/18/2024 27 (H)  8 - 23 mg/dL Final    Creatinine 07/18/2024 0.9  0.5 - 1.4 mg/dL Final    Calcium 07/18/2024 9.2  8.7 - 10.5 mg/dL Final    Anion Gap 07/18/2024 9  8 - 16 mmol/L Final    eGFR 07/18/2024 >60.0  >60 mL/min/1.73 m^2 Final    ALT 07/18/2024 26  10 - 44 U/L Final    Cholesterol 07/18/2024 193  120 - 199 mg/dL Final    Triglycerides 07/18/2024 139  30 - 150 mg/dL Final    HDL 07/18/2024 40  40 - 75 mg/dL Final    LDL Cholesterol 07/18/2024 125.2  63.0 - 159.0 mg/dL Final    HDL/Cholesterol Ratio 07/18/2024 20.7  20.0 - 50.0 % Final    Total Cholesterol/HDL Ratio 07/18/2024 4.8  2.0 - 5.0 Final    Non-HDL Cholesterol 07/18/2024 153  mg/dL Final    PSA, Screen 07/18/2024 1.56  0.00 - 4.00 ng/mL Final    Hemoglobin A1c referral test 07/18/2024 8.1 (H)  4.8 - 5.6 % Final       1. Benign essential HTN  Comments:  Currently he is on lisinopril which will be continued.  Diagnosis of diabetes also establish today.  Continue to watch diet and weight management    2. Type 2 diabetes mellitus without complication, without long-term current use of insulin  Comments:  start with metformin once a day for 7 days and then twice a day.  Watch out for any GI side effects like abdominal cramps.  Orders:  -     metFORMIN  (GLUCOPHAGE-XR) 500 MG ER 24hr tablet; Take 1 tablet (500 mg total) by mouth 2 (two) times daily with meals.  Dispense: 60 tablet; Refill: 11  -     blood-glucose meter kit; To check BG 1 times daily, to use with insurance preferred meter  Dispense: 1 each; Refill: 0  -     lancets Misc; To check BG 1 times daily, to use with insurance preferred meter  Dispense: 50 each; Refill: 05  -     blood sugar diagnostic Strp; To check BG 1 times daily, to use with insurance preferred meter  Dispense: 50 strip; Refill: 5    3. Acute pain of right knee  Comments:  Right infrapatellar knee pain and I suspect interim deal derangement.  Orthopedic evaluation.  Interestingly he got a call immediately for appointment  Orders:  -     Ambulatory referral/consult to Orthopedics; Future; Expected date: 07/29/2024           Glucose 70 - 110 mg/dL 201 High  187 High  147 High  177 High  117 High  119 High     BUN 8 - 23 mg/dL 27 High  26 High  29 High  32 High  26 High  28 High  34 High  R   2 Result Notes       2 Patient Communications          Component Ref Range & Units 4 d ago 1 yr ago 2 yr ago 4 yr ago   PSA, Screen 0.00 - 4.00 ng/mL 1.56 1.8 CM 1.6 1.9        Plan:   Benign essential HTN  Comments:  Currently he is on lisinopril which will be continued.  Diagnosis of diabetes also establish today.  Continue to watch diet and weight management    Type 2 diabetes mellitus without complication, without long-term current use of insulin  Comments:  start with metformin once a day for 7 days and then twice a day.  Watch out for any GI side effects like abdominal cramps.  Orders:  -     metFORMIN (GLUCOPHAGE-XR) 500 MG ER 24hr tablet; Take 1 tablet (500 mg total) by mouth 2 (two) times daily with meals.  Dispense: 60 tablet; Refill: 11  -     blood-glucose meter kit; To check BG 1 times daily, to use with insurance preferred meter  Dispense: 1 each; Refill: 0  -     lancets Misc; To check BG 1 times daily, to use with insurance preferred  meter  Dispense: 50 each; Refill: 05  -     blood sugar diagnostic Strp; To check BG 1 times daily, to use with insurance preferred meter  Dispense: 50 strip; Refill: 5    Acute pain of right knee  Comments:  Right infrapatellar knee pain and I suspect interim deal derangement.  Orthopedic evaluation.  Interestingly he got a call immediately for appointment  Orders:  -     Ambulatory referral/consult to Orthopedics; Future; Expected date: 07/29/2024        Today we did have a discussion about his elevated blood sugars and A1c level.  He was given adequate opportunity for 6 months to mend his diet, lose weight and this did not happen.      Given his A1c level above 8, he will be formally diagnosed with diagnosis of diabetes effective today.    Will start him on metformin initially at 500 mg once a day and   Subsequently increased to twice a day and try to optimize his diet again.  A dietary consult will be provided down the road based upon availability, coverage acceptance.    Had gross discussion about diet and to avoid carbohydrates like sugars, simple starches, waffles, pancakes, sit-ups, biscuit, cookies etc..  Incorporate more greens, vegetables and lean cuts of proteIns Like  fish and chicken.  Grilled or broiled rather than fried.  Go for walking, consider joining a gym.    Discussed about vaccinations like pneumonia and flu.        It has been slightly overwhelming for him today to accept the diagnosis and further long string of recommended attachments   that come with the diagnosis including statins, continuation of Ace/ARB, ophthalmology examination, foot examination, urine microalbumin, regular vaccinations and immunizations.     I have still encouraged him, that with weight loss and proper diet he can beat the diabetes and he maybe off the medications.    I have advised him that given his underlying diabetes, he may be susceptible to infections and hence the importance of vaccinations.      Foot  examination has been done now given his diabetic status.    Follow-up in 2 or 3 months to review his blood sugars and check A1c level again.      Possibility of   Adding GLP 1 to aid in weight management. Subject to cost and coverage issues and most importantly availability given big demand and diversion towards weight loss market.      He is already on lisinopril and addition of statin will be important.      Lipid panel is not that bad. But may have to add it in future.  Follow up in about 6 weeks (around 9/2/2024), or if symptoms worsen or fail to improve, for Diabetes/HTN/Lipids.    Spent arline 35 minutes with patient which involved review of pts medical conditions, labs, medications and with 50% of time face-to-face discussion about medical problems, management and any applicable changes.    Current Outpatient Medications:     lisinopriL 10 MG tablet, Take 1 tablet (10 mg total) by mouth once daily., Disp: 90 tablet, Rfl: 3    multivitamin capsule, Take 1 capsule by mouth once daily., Disp: , Rfl:     prednisoLONE acetate (PRED FORTE) 1 % DrpS, INSTILL 1 DROP INTO RIGHT EYE EVERY DAY, Disp: , Rfl:     timolol maleate 0.5% (TIMOPTIC) 0.5 % Drop, , Disp: , Rfl:     blood sugar diagnostic Strp, To check BG 1 times daily, to use with insurance preferred meter, Disp: 50 strip, Rfl: 5    blood-glucose meter kit, To check BG 1 times daily, to use with insurance preferred meter, Disp: 1 each, Rfl: 0    lancets Misc, To check BG 1 times daily, to use with insurance preferred meter, Disp: 50 each, Rfl: 05    metFORMIN (GLUCOPHAGE-XR) 500 MG ER 24hr tablet, Take 1 tablet (500 mg total) by mouth 2 (two) times daily with meals., Disp: 60 tablet, Rfl: 11    Current Facility-Administered Medications:     sodium chloride 0.9% flush 10 mL, 10 mL, Intravenous, PRN, Karen Garcia, FNP-C    Celestine Walsh

## 2024-07-23 ENCOUNTER — PATIENT OUTREACH (OUTPATIENT)
Dept: ADMINISTRATIVE | Facility: HOSPITAL | Age: 69
End: 2024-07-23
Payer: COMMERCIAL

## 2024-07-23 DIAGNOSIS — M25.569 KNEE PAIN, UNSPECIFIED CHRONICITY, UNSPECIFIED LATERALITY: Primary | ICD-10-CM

## 2024-07-23 NOTE — LETTER
AUTHORIZATION FOR RELEASE OF   CONFIDENTIAL INFORMATION    Dear Dr. Bill Pruitt,      We are seeing Pierce Kim, date of birth 1955, in the clinic at [facility]. Celestine Walsh MD is the patient's PCP. Pierce Kim has an outstanding lab/procedure at the time we reviewed his chart. In order to help keep his health information updated, he has authorized us to request the following medical record(s):        ( x )  EYE EXAM                             Please fax records to [facility], Raina, Sanjay, MD, [phone]   If you have any questions, please contact       Hilda Irizarry  Nurse Clinical Care Coordinator  [facility]  Phone: [phone]  Fax: [phone]      Patient Name: Pierce Kim  : 1955  Patient Phone #: [phone]        Pierce Kim  MRN: [id number]  : 1955  Age: 68 y.o.  Sex: male         Patient/Legal Guardian Signature  This signature was collected at 2024           _______________________________   Printed Name/Relationship to Patient      Consent for Examination and Treatment: I hereby authorize the providers and employees of [facility] ([facility]) to provide medical treatment/services which includes, but is not limited to, performing and administering tests and diagnostic procedures that are deemed necessary, including, but not limited to, imaging examinations, blood tests and other laboratory procedures as may be required by the hospital, clinic, or may be ordered by my physician(s) or persons working under the general and/or special instructions of my physician(s).      I understand and agree that this consent covers all authorized persons, including but not limited to physicians, residents, nurse practitioners, physicians' assistants, specialists, consultants, student nurses, and independently contracted physicians, who are called upon by the physician in charge, to  carry out the diagnostic procedures and medical or surgical treatment.     I hereby authorize [facility] to retain or dispose of any specimens or tissue, should there be such remaining from any test or procedure.     I hereby authorize and give consent for [facility] providers and employees to take photographs, images or videotapes of such diagnostic, surgical or treatment procedures of Patient as may be required by [facility] or as may be ordered by a physician. I further acknowledge and agree that [facility] may use cameras or other devices for patient monitoring.     I am aware that the practice of medicine is not an exact science, and I acknowledge that no guarantees have been made to me as to the outcome of any tests, procedures or treatment.     Authorization for Release of Information: I understand that my insurance company and/or their agents may need information necessary to make determinations about payment/reimbursement. I hereby provide authorization to release to all insurance companies, their successors, assignees, other parties with whom they may have contracted, or others acting on their behalf, that are involved with payment for any hospital and/or clinic charges incurred by the patient, any information that they request and deem necessary for payment/reimbursement, and/or quality review.  I further authorize the release of my health information to physicians or other health care practitioners on staff who are involved in my health care now and in the future, and to other health care providers, entities, or institutions for the purpose of my continued care and treatment, including referrals.     REGISTRATION AUTHORIZATION  Form No. 73538 (Rev. 7/13/2022)       Medicare Patient's Certification and Authorization to Release Information and Payment Request:  I certify that the information given by me in applying for payment under Title XVIII of the Social Security Act is correct. I authorize any mccormick of medical  or other information about me to release to the [facility], or its intermediaries or carriers, any information needed for this or a related Medicare claim. I request that payment of authorized benefits be made on my behalf.     Assignment of Insurance Benefits:   I hereby authorize any and all insurance companies, health plans, defined   benefit plans, health insurers or any entity that is or may be responsible for payment of my medical expenses to pay all hospital and medical benefits now due, and to become due and payable to me under any hospital benefits, sick benefits, injury benefits or any other benefit for services rendered to me, including Major Medical Benefits, direct to [facility] and all independently contracted physicians. I assign any and all rights that I may have against any and all insurance companies, health plans, defined benefit plans, health insurers or any entity that is or may be responsible for payment of my medical expenses, including, but not limited to any right to appeal a denial of a claim, any right to bring any action, lawsuit, administrative proceeding, or other cause of action on my behalf. I specifically assign my right to pursue litigation against any and all insurance companies, health plans, defined benefit plans, health insurers or any entity that is or may be responsible for payment of my medical expenses based upon a refusal to pay charges.            E. Valuables: It is understood and agreed that [facility] is not liable for the damage to or loss of any money, jewelry,   documents, dentures, eye glasses, hearing aids, prosthetics, or other property of value.     F. Computer Equipment: I understand and agree that should I choose to use computer equipment owned by [facility] or if I choose to access the Internet via [facility]s network, I do so at my own risk. [facility] is not responsible for any damage to my computer equipment or to any damages of any type that might  arise from my loss of equipment or data.     G. Acceptance of Financial Responsibility:  I agree that in consideration of the services and   supplies that have been   or will be furnished to the patient, I am hereby obligated to pay all charges made for or on the account of the patient according to the standard rates (in effect at the time the services and supplies are delivered) established by [facility], including its Patient Financial Assistance Policy to the extent it is applicable. I understand that I am responsible for all charges, or portions thereof, not covered by insurance or other sources. Patient refunds will be distributed only after balances at all [facility] facilities are paid.     H. Communication Authorization:  I hereby authorize [facility] and its representatives, along with any billing service   or  who may work on their behalf, to contact me on   my cell phone and/or home phone using pre- recorded messages, artificial voice messages, automatic telephone dialing devices or other computer assisted technology, or by electronic      mail, text messaging, or by any other form of electronic communication. This includes, but is not limited to, appointment reminders, yearly physical exam reminders, preventive care reminders, patient campaigns, welcome calls, and calls about account balances on my account or any account on which I am listed as a guarantor. I understand I have the right to opt out of these communications at any time.      Relationship  Between  Facility and  Provider:      I understand that some, but not all, providers furnishing services to the patient are not employees or agents of [facility]. The patient is under the care and supervision of his/her attending physician, and it is the responsibility of the facility and its nursing staff to carry out the instructions of such physicians. It is the responsibility of the patient's physician/designee to obtain the patient's informed  consent, when required, for medical or surgical treatment, special diagnostic or therapeutic procedures, or hospital services rendered for the patient under the special instructions of the physician/designee.     REGISTRATION AUTHORIZATION  Form No. 54692 (Rev. 7/13/2022)      Notice of Privacy Practices: I acknowledge I have received a copy of [facility]'s Notice of Privacy Practices.     Facility  Directory: I have discussed with the organization my desire to be either included or excluded  in the facility directory in the event of my being an inpatient at an [facility] facility. I understand that if my choice is to opt-out of being identified in the facility directory that the facility will not provide any information about me such as my condition (e.g. fair, stable, etc.) or my location in the facility (e.g., room number, department).     Immunizations: [facility] shares immunization information with state sponsored health departments to help you and your doctor keep track of your immunization records. By signing, you consent to have this information shared with the health department in your state:                                [place] - LINKS ([place] Immunization Network for Kids Statewide)                                [place] - MIIX ([place] Immunization Information eXchange)                                [place] - ImmPRINT (Immunization Patient Registry with Integrated Technology)     TERM: This authorization is valid for this and subsequent care/treatment I receive at [facility] and will remain valid unless/until revoked in writing by me.     [facility]: As used in this document, [facility] means all [facility] owned and managed facilities, including, but not limited to, all health centers, surgery centers, clinics, urgent care centers, and hospitals.         [facility] complies with applicable Federal civil rights laws and does not discriminate on the basis of race, color,  national origin, age, disability, or sex.  ATENCIÓN: si habla español, tiene a crews disposición servicios gratuitos de asistencia lingüística. Anali al [phone].  CHÚ Ý: N?u b?n nói Ti?ng Vi?t, có các d?ch v? h? tr? ngôn ng? mi?n phí dành cho b?n. G?i s? [phone].        REGISTRATION AUTHORIZATION  Form No. 99192 (Rev. 7/13/2022)   Patient

## 2024-07-23 NOTE — PROGRESS NOTES
Population Health Chart Review & Patient Outreach Details      Additional [facility] Health Notes:               Updates Requested / Reviewed:      Updated Care Coordination Note, Care Everywhere, , Care Team Updated, and Immunizations Reconciliation Completed or Queried: [facility] Topics Overdue:      [facility] Score: 2     Colon Cancer Screening  Eye Exam    Pneumonia Vaccine  RSV Vaccine                  Health Maintenance Topic(s) Outreach Outcomes & Actions Taken:    Colorectal Cancer Screening - Outreach Outcomes & Actions Taken  : External Records Uploaded, Care Team Updated, & History Updated if Applicable    Eye Exam - Outreach Outcomes & Actions Taken  : External Records Requested & Care Team Updated if Applicable

## 2024-07-26 ENCOUNTER — PATIENT MESSAGE (OUTPATIENT)
Dept: ADMINISTRATIVE | Facility: HOSPITAL | Age: 69
End: 2024-07-26
Payer: COMMERCIAL

## 2024-07-26 ENCOUNTER — OFFICE VISIT (OUTPATIENT)
Dept: ORTHOPEDICS | Facility: CLINIC | Age: 69
End: 2024-07-26
Payer: COMMERCIAL

## 2024-07-26 ENCOUNTER — HOSPITAL ENCOUNTER (OUTPATIENT)
Dept: RADIOLOGY | Facility: HOSPITAL | Age: 69
Discharge: HOME | End: 2024-07-26
Attending: FAMILY MEDICINE
Payer: COMMERCIAL

## 2024-07-26 VITALS — WEIGHT: 248 LBS | HEIGHT: 71 IN | BODY MASS INDEX: 34.72 KG/M2

## 2024-07-26 DIAGNOSIS — M25.561 ACUTE PAIN OF RIGHT KNEE: ICD-10-CM

## 2024-07-26 DIAGNOSIS — M23.203 DEGENERATIVE TEAR OF MEDIAL MENISCUS, RIGHT: ICD-10-CM

## 2024-07-26 DIAGNOSIS — M17.11 PRIMARY OSTEOARTHRITIS OF RIGHT KNEE: Primary | ICD-10-CM

## 2024-07-26 DIAGNOSIS — M25.569 KNEE PAIN, UNSPECIFIED CHRONICITY, UNSPECIFIED LATERALITY: ICD-10-CM

## 2024-07-26 PROCEDURE — 73562 X-RAY EXAM OF KNEE 3: CPT | Mod: TC,PO,LT

## 2024-07-26 PROCEDURE — 99999 PR PBB SHADOW E&M-EST. PATIENT-LVL III: CPT | Mod: PBBFAC,,, | Performed by: FAMILY MEDICINE

## 2024-07-26 PROCEDURE — 73562 X-RAY EXAM OF KNEE 3: CPT | Mod: 26,59,LT, | Performed by: RADIOLOGY

## 2024-07-26 PROCEDURE — 73564 X-RAY EXAM KNEE 4 OR MORE: CPT | Mod: 26,RT,, | Performed by: RADIOLOGY

## 2024-07-26 RX ORDER — NABUMETONE 500 MG/1
500 TABLET, FILM COATED ORAL 2 TIMES DAILY PRN
Qty: 60 TABLET | Refills: 2 | Status: SHIPPED | OUTPATIENT
Start: 2024-07-26

## 2024-07-26 NOTE — PROGRESS NOTES
Subjective     Patient ID: Pierce Kim is a 68 y.o. male.    Chief Complaint: Pain of the Right Knee (Pt c/o R) knee pain. Pt states pain is a dull pain that will at random turn into a sharp pain. Pt states he first noticed pain in April of current year. Pt denies any recent injury or fall. )    68-year-old man here today with complaints of right-sided knee pain.  He 1st noticed this pain around April of this year.  Around that time he was doing a lot of physical labor climbing up and down ladders and working on the house when he felt a sharp pain in the anterior medial aspect of his knee.  Shortly after that he began feeling sensations at random when walking or ambulating that his knee might lock up or give out.  Purchased a off-the-shelf brace for his knee which did seem to help.  Since that time he notes the pain has lessened then he is not noticing that sensation of instability.  He has since done a large amount of activity walking 2-3 miles a day for exercise as well as going on a trip to [place] and other places without a similar incident.  However he is still feeling occasional pain in the same area.  He has not noticed any swelling of the area.  He has tried taking aspirin that does provide some relief.  Denies any previous history of injury to the knee.    Pain  Pertinent negatives include no chest pain, chills, congestion, coughing, headaches, rash or sore throat.       Review of Systems   Constitutional: Negative for chills and decreased appetite.   HENT:  Negative for congestion and sore throat.    Eyes:  Negative for blurred vision.   Cardiovascular:  Negative for chest pain, dyspnea on exertion and palpitations.   Respiratory:  Negative for cough and shortness of breath.    Skin:  Negative for rash.   Neurological:  Negative for difficulty with concentration, disturbances in coordination and headaches.   Psychiatric/Behavioral:  Negative for altered mental status, depression, hallucinations,  memory loss and suicidal ideas.           Objective     General    Nursing note and vitals reviewed.  Constitutional: He is oriented to person, place, and time. He appears well-developed and well-nourished.   HENT:   Nose: Nose normal.   Eyes: EOM are normal. Pupils are equal, round, and reactive to light.   Neck: Neck supple.   Cardiovascular:  Normal rate.            Pulmonary/Chest: Effort normal.   Abdominal: Soft.   Neurological: He is alert and oriented to person, place, and time. He has normal reflexes.   Psychiatric: He has a normal mood and affect. His behavior is normal. Judgment and thought content normal.           Right Knee Exam     Inspection   Effusion: present    Tenderness   The patient is tender to palpation of the medial joint line.    Crepitus   The patient has crepitus of the patella and medial joint line.    Range of Motion   Extension:  50   Flexion:  140     Tests   Meniscus   Rebecca:  Medial - positive Lateral - negative  Ligament Examination   Lachman: normal (-1 to 2mm)   PCL-Posterior Drawer: normal (0 to 2mm)     MCL - Valgus: normal (0 to 2mm)  LCL - Varus: normal  Patella   Patellar apprehension: negative  Passive Patellar Tilt: neutral  Patellar Tracking: normal    Other   Popliteal (Baker's) Cyst: absent  Sensation: normal    Left Knee Exam   Left knee exam is normal.    Range of Motion   Extension:  normal   Flexion:  normal     Tests   Stability   Lachman: normal (-1 to 2mm)   PCL-Posterior Drawer: normal (0 to 2mm)  MCL - Valgus: normal (0 to 2mm)  LCL - Varus: normal (0 to 2mm)    Muscle Strength   Right Lower Extremity   Quadriceps:  5/5   Hamstrin/5     Vascular Exam     Right Pulses  Dorsalis Pedis:      2+            Physical Exam  Vitals and nursing note reviewed.   Constitutional:       Appearance: He is well-developed and well-nourished.   HENT:      Nose: Nose normal.   Eyes:      Extraocular Movements: EOM normal.      Pupils: Pupils are equal, round, and  "reactive to light.   Cardiovascular:      Rate and Rhythm: Normal rate.      Pulses:           Dorsalis pedis pulses are 2+ on the right side.   Pulmonary:      Effort: Pulmonary effort is normal.   Abdominal:      Palpations: Abdomen is soft.   Musculoskeletal:      Cervical back: Normal range of motion and neck supple.      Right knee: Effusion present.      Instability Tests: Medial Rebecca test positive. Lateral Rebecca test negative.   Neurological:      Mental Status: He is alert and oriented to person, place, and time.      Deep Tendon Reflexes: Reflexes are normal and symmetric.   Psychiatric:         Mood and Affect: Mood and affect normal.         Behavior: Behavior normal.         Thought Content: Thought content normal.         Judgment: Judgment normal.        X-ray images ordered obtained interpreted by me.  They show mild degenerative changes in both knees in all three compartments.  There was no obvious bony abnormalities no acute fractures present.  No obvious effusion no evidence of any soft tissue injury.       Assessment and Plan     Encounter Diagnoses   Name Primary?    Acute pain of right knee     Primary osteoarthritis of right knee Yes    Degenerative tear of medial meniscus, right          Pierce Garzon" was seen today for pain.    Diagnoses and all orders for this visit:    Primary osteoarthritis of right knee    Acute pain of right knee  Comments:  Right infrapatellar knee pain and I suspect interim deal derangement.  Orthopedic evaluation.  Interestingly he got a call immediately for appointment  Orders:  -     Ambulatory referral/consult to Orthopedics    Degenerative tear of medial meniscus, right    Other orders  -     nabumetone (RELAFEN) 500 MG tablet; Take 1 tablet (500 mg total) by mouth 2 (two) times daily as needed for Pain.    Based on history and exam I believe he has small degenerative meniscus tear as well as some mild osteoarthritis in both knees.  I discussed these " findings and treatment options with him today.  He is currently not significantly limited by his condition.  Went to prescribe him Relafen he may take as needed for pain and inflammation.  Should condition become worse or he become more concerned I would consider cortisone injection into the knee as a treatment option.  Follow up here as needed.

## 2024-08-06 ENCOUNTER — PATIENT MESSAGE (OUTPATIENT)
Dept: FAMILY MEDICINE | Facility: CLINIC | Age: 69
End: 2024-08-06
Payer: COMMERCIAL

## 2024-09-16 ENCOUNTER — TELEPHONE (OUTPATIENT)
Dept: FAMILY MEDICINE | Facility: CLINIC | Age: 69
End: 2024-09-16
Payer: COMMERCIAL

## 2024-09-16 NOTE — TELEPHONE ENCOUNTER
----- Message from Maria R Graves sent at 9/16/2024  7:11 AM CDT -----  -9/13-2:54 pm- pt is calling nurse back about rescheduling his appt   [phone]

## 2024-10-01 ENCOUNTER — PATIENT MESSAGE (OUTPATIENT)
Dept: ADMINISTRATIVE | Facility: HOSPITAL | Age: 69
End: 2024-10-01
Payer: COMMERCIAL

## 2024-10-10 ENCOUNTER — OFFICE VISIT (OUTPATIENT)
Dept: FAMILY MEDICINE | Facility: CLINIC | Age: 69
End: 2024-10-10
Payer: COMMERCIAL

## 2024-10-10 ENCOUNTER — PATIENT MESSAGE (OUTPATIENT)
Dept: FAMILY MEDICINE | Facility: CLINIC | Age: 69
End: 2024-10-10

## 2024-10-10 VITALS
SYSTOLIC BLOOD PRESSURE: 128 MMHG | DIASTOLIC BLOOD PRESSURE: 87 MMHG | HEART RATE: 84 BPM | BODY MASS INDEX: 32.36 KG/M2 | OXYGEN SATURATION: 97 % | WEIGHT: 232 LBS

## 2024-10-10 DIAGNOSIS — E11.9 TYPE 2 DIABETES MELLITUS WITHOUT COMPLICATION, WITHOUT LONG-TERM CURRENT USE OF INSULIN: ICD-10-CM

## 2024-10-10 DIAGNOSIS — I10 BENIGN ESSENTIAL HTN: Primary | ICD-10-CM

## 2024-10-10 PROCEDURE — 3066F NEPHROPATHY DOC TX: CPT | Mod: CPTII,S$GLB,, | Performed by: INTERNAL MEDICINE

## 2024-10-10 PROCEDURE — 3074F SYST BP LT 130 MM HG: CPT | Mod: CPTII,S$GLB,, | Performed by: INTERNAL MEDICINE

## 2024-10-10 PROCEDURE — 1126F AMNT PAIN NOTED NONE PRSNT: CPT | Mod: CPTII,S$GLB,, | Performed by: INTERNAL MEDICINE

## 2024-10-10 PROCEDURE — 99213 OFFICE O/P EST LOW 20 MIN: CPT | Mod: S$GLB,,, | Performed by: INTERNAL MEDICINE

## 2024-10-10 PROCEDURE — 1160F RVW MEDS BY RX/DR IN RCRD: CPT | Mod: CPTII,S$GLB,, | Performed by: INTERNAL MEDICINE

## 2024-10-10 PROCEDURE — 1159F MED LIST DOCD IN RCRD: CPT | Mod: CPTII,S$GLB,, | Performed by: INTERNAL MEDICINE

## 2024-10-10 PROCEDURE — 99999 PR PBB SHADOW E&M-EST. PATIENT-LVL III: CPT | Mod: PBBFAC,,, | Performed by: INTERNAL MEDICINE

## 2024-10-10 PROCEDURE — 3079F DIAST BP 80-89 MM HG: CPT | Mod: CPTII,S$GLB,, | Performed by: INTERNAL MEDICINE

## 2024-10-10 PROCEDURE — 3061F NEG MICROALBUMINURIA REV: CPT | Mod: CPTII,S$GLB,, | Performed by: INTERNAL MEDICINE

## 2024-10-10 PROCEDURE — 3052F HG A1C>EQUAL 8.0%<EQUAL 9.0%: CPT | Mod: CPTII,S$GLB,, | Performed by: INTERNAL MEDICINE

## 2024-10-10 PROCEDURE — 3008F BODY MASS INDEX DOCD: CPT | Mod: CPTII,S$GLB,, | Performed by: INTERNAL MEDICINE

## 2024-10-10 PROCEDURE — 4010F ACE/ARB THERAPY RXD/TAKEN: CPT | Mod: CPTII,S$GLB,, | Performed by: INTERNAL MEDICINE

## 2024-10-10 RX ORDER — METFORMIN HYDROCHLORIDE 500 MG/1
500 TABLET, EXTENDED RELEASE ORAL 2 TIMES DAILY WITH MEALS
Qty: 180 TABLET | Refills: 3 | Status: SHIPPED | OUTPATIENT
Start: 2024-10-10 | End: 2025-10-10

## 2024-10-10 RX ORDER — LANCETS 33 GAUGE
1 EACH MISCELLANEOUS DAILY
Qty: 100 EACH | Refills: 3 | Status: SHIPPED | OUTPATIENT
Start: 2024-10-10 | End: 2024-10-10

## 2024-10-10 RX ORDER — LISINOPRIL 10 MG/1
10 TABLET ORAL DAILY
Qty: 90 TABLET | Refills: 3 | Status: SHIPPED | OUTPATIENT
Start: 2024-10-10

## 2024-10-10 RX ORDER — LANCETS 33 GAUGE
1 EACH MISCELLANEOUS DAILY
Qty: 100 EACH | Refills: 3 | Status: SHIPPED | OUTPATIENT
Start: 2024-10-10

## 2024-10-10 NOTE — PROGRESS NOTES
Subjective:       Patient ID: Pierce Segundo is a 69 y.o. male.    Chief Complaint: Follow-up, Hyperlipidemia, Hypertension, and Diabetes      (There seem to be some glitch in our appointment system and this gentleman had to wait for some time before I could see him -but he was very gracious)     This is an interim follow-up for Mr. Clemente Segundo who is a 69-year-old gentleman.  Two months ago I had formally diagnosed him bed type 2 diabetes mellitus given his A1c level of 8.0.     He was initiated on metformin and he took the challenge of his diabetes to his hot in mind and really watching his diet.      He has lost 16 lb of weight. Berry !! Congrats !!      His numbers are getting better.  He was also on lisinopril 10 mg for renal protection and to help with blood pressure.  No side effects.  He is motivated now.    Hypertension  This is a chronic problem. The current episode started more than 1 year ago. The problem is controlled. Pertinent negatives include no anxiety, palpitations, shortness of breath or sweats. Risk factors for coronary artery disease include male gender, diabetes mellitus and obesity. Past treatments include ACE inhibitors. The current treatment provides moderate improvement. Compliance problems include diet and exercise.  There is no history of coarctation of the aorta, hyperaldosteronism, pheochromocytoma or sleep apnea.   Diabetes  He presents for his initial diabetic visit. He has type 2 diabetes mellitus. No MedicAlert identification noted. The initial diagnosis of diabetes was made 6 months ago. His disease course has been improving. Pertinent negatives for hypoglycemia include no confusion, dizziness or sweats. Pertinent negatives for diabetes include no fatigue, no polydipsia and no polyuria. Pertinent negatives for hypoglycemia complications include no blackouts, no hospitalization, no nocturnal hypoglycemia, no required assistance and no required glucagon injection. Risk factors for  coronary artery disease include diabetes mellitus, dyslipidemia, hypertension, male sex and obesity. Current diabetic treatment includes oral agent (monotherapy) (Currently on metformin which be doubled up now.). He has not had a previous visit with a dietitian. He participates in exercise daily. His home blood glucose trend is decreasing steadily. An ACE inhibitor/angiotensin II receptor blocker is being taken. He does not see a podiatrist.Eye exam is not current.       Past Medical History:   Diagnosis Date    Hyperlipidemia     Hypertension      Social History     Socioeconomic History    Marital status:      Spouse name: Zoila    Number of children: 3   Occupational History    Occupation: Retired      Comment: [employer]   Tobacco Use    Smoking status: Never    Smokeless tobacco: Never   Substance and Sexual Activity    Alcohol use: Yes     Comment: occasional    Drug use: No    Sexual activity: Yes     Partners: Female   Social History Narrative    2 G Lori 40 , radha 28     1 B TJ 36        GC 3     Social Drivers of Health     Financial Resource Strain: Low Risk  (1/17/2024)    Overall Financial Resource Strain (CARDIA)     Difficulty of Paying Living Expenses: Not hard at all   Food Insecurity: No Food Insecurity (1/17/2024)    Hunger Vital Sign     Worried About Running Out of Food in the Last Year: Never true     Ran Out of Food in the Last Year: Never true   Transportation Needs: No Transportation Needs (1/17/2024)    PRAPARE - Transportation     Lack of Transportation (Medical): No     Lack of Transportation (Non-Medical): No   Physical Activity: Sufficiently Active (1/17/2024)    Exercise Vital Sign     Days of Exercise per Week: 6 days     Minutes of Exercise per Session: 40 min   Stress: No Stress Concern Present (1/17/2024)    [place] citizen [place] [employer] - Occupational Stress Questionnaire     Feeling of Stress : Only a little   Housing Stability: Low Risk   (1/17/2024)    Housing Stability Vital Sign     Unable to Pay for Housing in the Last Year: No     Number of Places Lived in the Last Year: 1     Unstable Housing in the Last Year: No     Past Surgical History:   Procedure Laterality Date    COLONOSCOPY  2017    EYE SURGERY       Family History   Problem Relation Name Age of Onset    Hypertension Mother Zahida Maldonado     Dementia Mother Zahida Maldonado     Emphysema Father Jorge L     Cancer Father Jorge L         Prostate & Esophageal CA    Hypertension Sister Ruthy         Takes 10 mg of lisinopril.       Review of Systems   Constitutional:  Negative for activity change, chills, fatigue and unexpected weight change (lost 16 lb of weight congratulations keep it up).          LOST 16 LB OF WEIGHT AFTER WATCHING HIS DIET AND EXERCISE EFFORTS.  CONGRATULATIONS AND KEEP IT UP.   HENT:  Negative for hearing loss, rhinorrhea and trouble swallowing.    Eyes:  Negative for discharge, redness and visual disturbance.   Respiratory:  Negative for chest tightness, shortness of breath and wheezing.    Cardiovascular:  Negative for palpitations.   Gastrointestinal:  Negative for abdominal distention, blood in stool, constipation and diarrhea.   Endocrine: Negative for cold intolerance, polydipsia and polyuria.   Genitourinary:  Negative for difficulty urinating, hematuria and urgency.   Musculoskeletal:  Negative for arthralgias, gait problem and myalgias.          Right knee pain   Skin:  Negative for color change and wound.   Neurological:  Negative for dizziness, syncope and numbness.   Psychiatric/Behavioral:  Negative for agitation, confusion and dysphoric mood.          Objective:      Blood pressure 128/87, pulse 84, weight 105.2 kg (232 lb), SpO2 97%. Body mass index is 32.36 kg/m².  Physical Exam  Vitals and nursing note reviewed.   Constitutional:       General: He is not in acute distress.     Appearance: Normal appearance. He is well-developed. He is not ill-appearing,  toxic-appearing or diaphoretic.      Comments: BMI   32.36 AND LOSS OF 16 LB OF WEIGHT SINCE THE  LAST DOCUMENTATION.   HENT:      Head: Normocephalic and atraumatic.   Eyes:      General: No scleral icterus.        Right eye: No discharge.         Left eye: No discharge.   Neck:      Thyroid: No thyromegaly.      Vascular: No JVD.      Trachea: No tracheal deviation.   Cardiovascular:      Rate and Rhythm: Normal rate and regular rhythm.      Heart sounds: Normal heart sounds. No murmur heard.     No friction rub. No gallop.   Pulmonary:      Effort: Pulmonary effort is normal. No respiratory distress.      Breath sounds: Normal breath sounds.   Abdominal:      General: There is no distension.      Palpations: Abdomen is soft.      Tenderness: There is no abdominal tenderness.   Musculoskeletal:         General: No tenderness or deformity.      Cervical back: Neck supple.      Right lower leg: No edema.      Left lower leg: No edema.   Lymphadenopathy:      Cervical: No cervical adenopathy.   Skin:     General: Skin is warm and dry.      Findings: No lesion or rash.      Comments: Dry skin   Neurological:      Mental Status: He is alert. Mental status is at baseline.      Motor: No weakness.   Psychiatric:         Behavior: Behavior normal.           Assessment:       Lab Visit on 07/18/2024   Component Date Value Ref Range Status    Microalbumin, Urine 07/18/2024 13.6  <19.9 ug/mL Final    Creatinine, Urine 07/18/2024 200.9  23.0 - 375.0 mg/dL Final    Microalb/Creat Ratio 07/18/2024 6.8  0.0 - 30.0 ug/mg Final    Sodium 07/18/2024 136  136 - 145 mmol/L Final    Potassium 07/18/2024 4.3  3.5 - 5.1 mmol/L Final    Chloride 07/18/2024 101  95 - 110 mmol/L Final    CO2 07/18/2024 26  23 - 29 mmol/L Final    Glucose 07/18/2024 201 (H)  70 - 110 mg/dL Final    BUN 07/18/2024 27 (H)  8 - 23 mg/dL Final    Creatinine 07/18/2024 0.9  0.5 - 1.4 mg/dL Final    Calcium 07/18/2024 9.2  8.7 - 10.5 mg/dL Final    Anion Gap  07/18/2024 9  8 - 16 mmol/L Final    eGFR 07/18/2024 >60.0  >60 mL/min/1.73 m^2 Final    ALT 07/18/2024 26  10 - 44 U/L Final    Cholesterol 07/18/2024 193  120 - 199 mg/dL Final    Triglycerides 07/18/2024 139  30 - 150 mg/dL Final    HDL 07/18/2024 40  40 - 75 mg/dL Final    LDL Cholesterol 07/18/2024 125.2  63.0 - 159.0 mg/dL Final    HDL/Cholesterol Ratio 07/18/2024 20.7  20.0 - 50.0 % Final    Total Cholesterol/HDL Ratio 07/18/2024 4.8  2.0 - 5.0 Final    Non-HDL Cholesterol 07/18/2024 153  mg/dL Final    PSA, Screen 07/18/2024 1.56  0.00 - 4.00 ng/mL Final    Hemoglobin A1c referral test 07/18/2024 8.1 (H)  4.8 - 5.6 % Final       1. Benign essential HTN  -     lisinopriL 10 MG tablet; Take 1 tablet (10 mg total) by mouth once daily.  Dispense: 90 tablet; Refill: 3  -     Basic Metabolic Panel; Future; Expected date: 11/10/2024    2. Type 2 diabetes mellitus without complication, without long-term current use of insulin  -     Hemoglobin A1C; Future; Expected date: 11/10/2024  -     metFORMIN (GLUCOPHAGE-XR) 500 MG ER 24hr tablet; Take 1 tablet (500 mg total) by mouth 2 (two) times daily with meals.  Dispense: 180 tablet; Refill: 3  -     Discontinue: blood sugar diagnostic (ONETOUCH ULTRA TEST) Strp; 1 strip by Misc.(Non-Drug; Combo Route) route once daily. Please provide him 1 touch strips which matches with his machine.  Dispense: 100 strip; Refill: 3  -     Discontinue: lancets (ONETOUCH DELICA LANCETS) 33 gauge Misc; 1 lancet  by Misc.(Non-Drug; Combo Route) route once daily.  Dispense: 100 each; Refill: 3  -     Basic Metabolic Panel; Future; Expected date: 11/10/2024  -     blood sugar diagnostic (ONETOUCH ULTRA TEST) Strp; 1 strip by Misc.(Non-Drug; Combo Route) route once daily. Please provide him 1 touch strips which matches with his machine.  Dispense: 100 strip; Refill: 3  -     lancets (ONETOUCH DELICA LANCETS) 33 gauge Misc; 1 lancet  by Misc.(Non-Drug; Combo Route) route once daily.  Dispense:  100 each; Refill: 3             Plan:   Benign essential HTN  -     lisinopriL 10 MG tablet; Take 1 tablet (10 mg total) by mouth once daily.  Dispense: 90 tablet; Refill: 3  -     Basic Metabolic Panel; Future; Expected date: 11/10/2024    Type 2 diabetes mellitus without complication, without long-term current use of insulin  -     Hemoglobin A1C; Future; Expected date: 11/10/2024  -     metFORMIN (GLUCOPHAGE-XR) 500 MG ER 24hr tablet; Take 1 tablet (500 mg total) by mouth 2 (two) times daily with meals.  Dispense: 180 tablet; Refill: 3  -     Discontinue: blood sugar diagnostic (ONETOUCH ULTRA TEST) Strp; 1 strip by Misc.(Non-Drug; Combo Route) route once daily. Please provide him 1 touch strips which matches with his machine.  Dispense: 100 strip; Refill: 3  -     Discontinue: lancets (ONETOUCH DELICA LANCETS) 33 gauge Misc; 1 lancet  by Misc.(Non-Drug; Combo Route) route once daily.  Dispense: 100 each; Refill: 3  -     Basic Metabolic Panel; Future; Expected date: 11/10/2024  -     blood sugar diagnostic (ONETOUCH ULTRA TEST) Strp; 1 strip by Misc.(Non-Drug; Combo Route) route once daily. Please provide him 1 touch strips which matches with his machine.  Dispense: 100 strip; Refill: 3  -     lancets (ONETOUCH DELICA LANCETS) 33 gauge Misc; 1 lancet  by Misc.(Non-Drug; Combo Route) route once daily.  Dispense: 100 each; Refill: 3       Patient's medical conditions has been reviewed.  Remarkable weight loss of 16 lb has been noticed as he was taken the diagnosis of diabetes to his heart  Blood pressures are under control.    He was motivated to lose another 10 lb of weight  Discussed about pneumonia vaccine, ophthalmology examination.  New line foot examination was done last time.  New line social determinants of health care also updated.    Consideration for colon cancer screening either with Cologuard or    Follow up in about 4 months (around 2/10/2025), or if symptoms worsen or fail to improve, for  Diabetes/HTN/Lipids.      Current Outpatient Medications:     blood-glucose meter kit, To check BG 1 times daily, to use with insurance preferred meter, Disp: 1 each, Rfl: 0    multivitamin capsule, Take 1 capsule by mouth once daily., Disp: , Rfl:     timolol maleate 0.5% (TIMOPTIC) 0.5 % Drop, , Disp: , Rfl:     blood sugar diagnostic (ONETOUCH ULTRA TEST) Strp, 1 strip by Misc.(Non-Drug; Combo Route) route once daily. Please provide him 1 touch strips which matches with his machine., Disp: 100 strip, Rfl: 3    lancets (ONETOUCH DELICA LANCETS) 33 gauge Misc, 1 lancet  by Misc.(Non-Drug; Combo Route) route once daily., Disp: 100 each, Rfl: 3    lisinopriL 10 MG tablet, Take 1 tablet (10 mg total) by mouth once daily., Disp: 90 tablet, Rfl: 3    metFORMIN (GLUCOPHAGE-XR) 500 MG ER 24hr tablet, Take 1 tablet (500 mg total) by mouth 2 (two) times daily with meals., Disp: 180 tablet, Rfl: 3    prednisoLONE acetate (PRED FORTE) 1 % DrpS, INSTILL 1 DROP INTO RIGHT EYE EVERY DAY, Disp: , Rfl:   No current facility-administered medications for this visit.    Celestine Cheema     Appreciate your patient's for the glitch today in delaying your appointment.  Best regards    I am proud of you for losing weight thus helping manage your diabetes.  Best wishes on your next goal for losing another 10 lb of weight and congratulations and keep it up.      Few pertinent points I would like to highlight also for today     1.- let me know if you are interested in doing a colon cancer screening or  other option Cologuard testing which is simple and convenient.  And if so, when was the last time your screening was done?      2.- please consider taking a pneumonia vaccine also which is recommended for patients with diabetes and about age 65 and will be happy to offer you in our office    3.- please consider getting a eye checkup also done this year and if needed I can give you a referral.  Patient's diabetes should get a eye  checkup on an annual basis.    Let me know if any questions     Best regards     Dr. Jillian SALES

## 2024-10-16 ENCOUNTER — PATIENT MESSAGE (OUTPATIENT)
Dept: FAMILY MEDICINE | Facility: CLINIC | Age: 69
End: 2024-10-16
Payer: COMMERCIAL

## 2025-02-06 ENCOUNTER — LAB VISIT (OUTPATIENT)
Dept: LAB | Facility: HOSPITAL | Age: 70
End: 2025-02-06
Attending: INTERNAL MEDICINE
Payer: COMMERCIAL

## 2025-02-06 DIAGNOSIS — E11.9 TYPE 2 DIABETES MELLITUS WITHOUT COMPLICATION, WITHOUT LONG-TERM CURRENT USE OF INSULIN: ICD-10-CM

## 2025-02-06 DIAGNOSIS — I10 BENIGN ESSENTIAL HTN: ICD-10-CM

## 2025-02-06 LAB
ANION GAP SERPL CALC-SCNC: 9 MMOL/L (ref 8–16)
BUN SERPL-MCNC: 26 MG/DL (ref 8–23)
CALCIUM SERPL-MCNC: 9.8 MG/DL (ref 8.7–10.5)
CHLORIDE SERPL-SCNC: 102 MMOL/L (ref 95–110)
CO2 SERPL-SCNC: 26 MMOL/L (ref 23–29)
CREAT SERPL-MCNC: 1 MG/DL (ref 0.5–1.4)
EST. GFR  (NO RACE VARIABLE): >60 ML/MIN/1.73 M^2
ESTIMATED AVG GLUCOSE: 123 MG/DL (ref 68–131)
GLUCOSE SERPL-MCNC: 115 MG/DL (ref 70–110)
HBA1C MFR BLD: 5.9 % (ref 4–5.6)
POTASSIUM SERPL-SCNC: 4.9 MMOL/L (ref 3.5–5.1)
SODIUM SERPL-SCNC: 137 MMOL/L (ref 136–145)

## 2025-02-06 PROCEDURE — 80048 BASIC METABOLIC PNL TOTAL CA: CPT | Performed by: INTERNAL MEDICINE

## 2025-02-06 PROCEDURE — 36415 COLL VENOUS BLD VENIPUNCTURE: CPT | Performed by: INTERNAL MEDICINE

## 2025-02-06 PROCEDURE — 83036 HEMOGLOBIN GLYCOSYLATED A1C: CPT | Performed by: INTERNAL MEDICINE

## 2025-02-12 ENCOUNTER — PATIENT MESSAGE (OUTPATIENT)
Dept: FAMILY MEDICINE | Facility: CLINIC | Age: 70
End: 2025-02-12

## 2025-02-12 ENCOUNTER — OFFICE VISIT (OUTPATIENT)
Dept: FAMILY MEDICINE | Facility: CLINIC | Age: 70
End: 2025-02-12
Payer: COMMERCIAL

## 2025-02-12 VITALS
BODY MASS INDEX: 32.76 KG/M2 | WEIGHT: 234 LBS | DIASTOLIC BLOOD PRESSURE: 81 MMHG | HEIGHT: 71 IN | SYSTOLIC BLOOD PRESSURE: 120 MMHG

## 2025-02-12 DIAGNOSIS — E11.9 TYPE 2 DIABETES MELLITUS WITHOUT COMPLICATION, WITHOUT LONG-TERM CURRENT USE OF INSULIN: Primary | ICD-10-CM

## 2025-02-12 DIAGNOSIS — I10 BENIGN ESSENTIAL HTN: ICD-10-CM

## 2025-02-12 DIAGNOSIS — Z12.5 SCREENING FOR PROSTATE CANCER: ICD-10-CM

## 2025-02-12 PROCEDURE — 3288F FALL RISK ASSESSMENT DOCD: CPT | Mod: CPTII,S$GLB,, | Performed by: INTERNAL MEDICINE

## 2025-02-12 PROCEDURE — 4010F ACE/ARB THERAPY RXD/TAKEN: CPT | Mod: CPTII,S$GLB,, | Performed by: INTERNAL MEDICINE

## 2025-02-12 PROCEDURE — 3079F DIAST BP 80-89 MM HG: CPT | Mod: CPTII,S$GLB,, | Performed by: INTERNAL MEDICINE

## 2025-02-12 PROCEDURE — 3044F HG A1C LEVEL LT 7.0%: CPT | Mod: CPTII,S$GLB,, | Performed by: INTERNAL MEDICINE

## 2025-02-12 PROCEDURE — 1159F MED LIST DOCD IN RCRD: CPT | Mod: CPTII,S$GLB,, | Performed by: INTERNAL MEDICINE

## 2025-02-12 PROCEDURE — 99213 OFFICE O/P EST LOW 20 MIN: CPT | Mod: S$GLB,,, | Performed by: INTERNAL MEDICINE

## 2025-02-12 PROCEDURE — 99999 PR PBB SHADOW E&M-EST. PATIENT-LVL III: CPT | Mod: PBBFAC,,, | Performed by: INTERNAL MEDICINE

## 2025-02-12 PROCEDURE — 1101F PT FALLS ASSESS-DOCD LE1/YR: CPT | Mod: CPTII,S$GLB,, | Performed by: INTERNAL MEDICINE

## 2025-02-12 PROCEDURE — 1126F AMNT PAIN NOTED NONE PRSNT: CPT | Mod: CPTII,S$GLB,, | Performed by: INTERNAL MEDICINE

## 2025-02-12 PROCEDURE — 1160F RVW MEDS BY RX/DR IN RCRD: CPT | Mod: CPTII,S$GLB,, | Performed by: INTERNAL MEDICINE

## 2025-02-12 PROCEDURE — 3008F BODY MASS INDEX DOCD: CPT | Mod: CPTII,S$GLB,, | Performed by: INTERNAL MEDICINE

## 2025-02-12 PROCEDURE — 3074F SYST BP LT 130 MM HG: CPT | Mod: CPTII,S$GLB,, | Performed by: INTERNAL MEDICINE

## 2025-02-12 NOTE — PROGRESS NOTES
Subjective:       Patient ID: Pierce Kim is a 69 y.o. male.    Chief Complaint: Diabetes, Labs Only, and Hypertension    History of Present Illness    CHIEF COMPLAINT:  Pierce presents for a follow-up visit to discuss recent lab results and monitor his diabetes management.    HPI:  Pierce reports feeling improved overall since his last visit, attributing this to recent weight loss. He has lost weight through improved eating habits, particularly by addressing nighttime eating. He mentions weighing 229 lbs on his home scale, although the office scale shows 234 lbs. He expresses a desire to lose about 9 more lbs.    He reports that the Metformin medication initially caused some mild stomach discomfort in the first week of use, but this side effect has since resolved. He denies any current issues with the medication.    He confirms receiving a flu vaccine in October and recalls receiving both the shingles and pneumonia vaccines a few years ago at [facility] in [place], though the exact date is uncertain.    He denies any chest pain, shortness of breath, current foot problems, or infections.    MEDICATIONS:  Pierce is on Lisinopril for blood pressure management and to protect his kidneys from diabetes effects. He is also on Metformin for diabetes management.    MEDICAL HISTORY:  Pierce has a history of diabetes and hypertension. He received a flu vaccine in October. Pierce got the shingles vaccine in 2020. He also received the pneumonia vaccine, which he believes was administered at the same time as the shingles vaccine.    TEST RESULTS:  Pierce's recent A1c level was 5.9, which is his best level. His PSA test on 7/18/24 showed a result of 1.56. The previous PSA test result was 1.8, and an earlier test showed 1.9. Pierce completed a diabetic urine screening previously.      ROS:  General: +weight loss  Cardiovascular: -chest pain  Respiratory: -shortness of breath  Gastrointestinal: -change in bowel habits          Past Medical History:   Diagnosis Date    Hyperlipidemia     Hypertension     Type 2 diabetes mellitus without complication, without long-term current use of insulin 2/12/2025     Social History     Socioeconomic History    Marital status:      Spouse name: Zoila    Number of children: 3   Occupational History    Occupation: Retired      Comment: [employer]   Tobacco Use    Smoking status: Never    Smokeless tobacco: Never   Substance and Sexual Activity    Alcohol use: Yes     Comment: occasional    Drug use: No    Sexual activity: Yes     Partners: Female   Social History Narrative    2 G Lori 40 , radha 28     1 B TJ 36        GC 3     Social Drivers of Health     Financial Resource Strain: Low Risk  (1/17/2024)    Overall Financial Resource Strain (CARDIA)     Difficulty of Paying Living Expenses: Not hard at all   Food Insecurity: No Food Insecurity (1/17/2024)    Hunger Vital Sign     Worried About Running Out of Food in the Last Year: Never true     Ran Out of Food in the Last Year: Never true   Transportation Needs: No Transportation Needs (1/17/2024)    PRAPARE - Transportation     Lack of Transportation (Medical): No     Lack of Transportation (Non-Medical): No   Physical Activity: Sufficiently Active (1/17/2024)    Exercise Vital Sign     Days of Exercise per Week: 6 days     Minutes of Exercise per Session: 40 min   Stress: No Stress Concern Present (1/17/2024)    [employer] - Occupational Stress Questionnaire     Feeling of Stress : Only a little   Housing Stability: Low Risk  (1/17/2024)    Housing Stability Vital Sign     Unable to Pay for Housing in the Last Year: No     Number of Places Lived in the Last Year: 1     Unstable Housing in the Last Year: No     Past Surgical History:   Procedure Laterality Date    COLONOSCOPY  2017    EYE SURGERY       Family History   Problem Relation Name Age of Onset    Hypertension Mother Zahida Maldonado      "Dementia Mother Zahida Maldonado     Emphysema Father Jorge L     Cancer Father Jorge L         Prostate & Esophageal CA    Hypertension Sister Ruthy         Takes 10 mg of lisinopril.       Objective:        Blood pressure 120/81, pulse (P) 96, height 5' 11" (1.803 m), weight 106.1 kg (234 lb). Body mass index is 32.64 kg/m².  Physical Exam    Vitals: Height: 5 feet 11 inches. Weight: 234 lbs. Pulse: 96.  General: No acute distress. Well-developed. Well-nourished.  Eyes: EOMI. Sclerae anicteric.  HENT: Normocephalic. Atraumatic. Nares patent. Moist oral mucosa.  Ears: Bilateral TMs clear. Bilateral EACs clear.  Cardiovascular: Regular rate. Regular rhythm. No murmurs. No rubs. No gallops. Normal S1, S2.  Respiratory: Normal respiratory effort. Clear to auscultation bilaterally. No rales. No rhonchi. No wheezing.  Abdomen: Soft. Non-tender. Non-distended. Normoactive bowel sounds.  Musculoskeletal: No  obvious deformity.       Right foot: Normal range of motion. No deformity.      Left foot: Normal range of motion. No deformity.   Feet:      Right foot:      Protective Sensation: 5 sites tested.  5 sites sensed.      Skin integrity: No ulcer or blister.      Toenail Condition: Right toenails are abnormally thick.      Left foot:      Protective Sensation: 5 sites tested.  5 sites sensed.      Skin integrity: No ulcer or blister.      Toenail Condition: Left toenails are abnormally thick.     Extremities: No lower extremity edema.  Neurological: Alert & oriented x3. No slurred speech. Normal gait.  Psychiatric:  Appropriate euthymic  Skin: Warm. Dry. No rash. Slightly thick nails.  Diabetic Foot: No deformity.              Assessment:       Lab Visit on 02/06/2025   Component Date Value Ref Range Status    Hemoglobin A1C 02/06/2025 5.9 (H)  4.0 - 5.6 % Final    Estimated Avg Glucose 02/06/2025 123  68 - 131 mg/dL Final    Sodium 02/06/2025 137  136 - 145 mmol/L Final    Potassium 02/06/2025 4.9  3.5 - 5.1 mmol/L Final    " Chloride 02/06/2025 102  95 - 110 mmol/L Final    CO2 02/06/2025 26  23 - 29 mmol/L Final    Glucose 02/06/2025 115 (H)  70 - 110 mg/dL Final    BUN 02/06/2025 26 (H)  8 - 23 mg/dL Final    Creatinine 02/06/2025 1.0  0.5 - 1.4 mg/dL Final    Calcium 02/06/2025 9.8  8.7 - 10.5 mg/dL Final    Anion Gap 02/06/2025 9  8 - 16 mmol/L Final    eGFR 02/06/2025 >60.0  >60 mL/min/1.73 m^2 Final       Component Ref Range & Units 6 d ago  (2/6/25) 1 yr ago  (1/17/24) 1 yr ago  (3/7/23) 2 yr ago  (6/2/22) 5 yr ago  (7/12/19)   Hemoglobin A1C 4.0 - 5.6 % 5.9 High  7.6 High  R, CM 6.3 High  R, CM 6.1 R, CM 5.3 R       IMPRESSION:  - Patient shows significant improvement in A1c (5.9) and weight loss, attributed to better eating habits  - Continued current medication regimen given positive results  - Considered potential medication discontinuation if patient loses additional 5-9 lbs  - Monitored for any kidney damage from diabetes through annual urine screening    E11.9 TYPE 2 DIABETES MELLITUS WITHOUT COMPLICATIONS:  - Performed foot exam during visit.  - Emphasized the importance of proper nail care for diabetic patients to prevent infections.  - Noted patient's A1c level is 5.9, which is the best result to date.  - Performed sensory tests on patient's feet using monofilament and touch.  - Plan to check A1C level again in 6 months and perform annual urine test to check for kidney damage.  - Refilled Metformin prescription until October 25, 2025.  - Recommend weight loss to potentially eliminate need for medications.  - Ordered labs for next visit: A1C, basic metabolic panel, diabetic urine screening.  - Advised patient to schedule ophthalmological exam soon.    I10 ESSENTIAL (PRIMARY) HYPERTENSION:  - Noted patient's blood pressures are reported to be fairly stable.  - Continued Lisinopril with refills provided until October 24, 2025.    E66.3 OVERWEIGHT:  - Recorded patient's current weight as 234 lbs at a height of 5 feet 11  inches.  - Acknowledged patient's weight loss progress and goal.  - Discussed the impact of weight loss on liver function and overall health, including improved sleep.  - Encouraged continued weight loss, noting potential to eliminate need for medications.    B35.1 TINEA UNGUIUM:  - Observed thick toenails, possibly due to fungal infection.  - Instructed patient to trim toenails regularly and file them to reduce fungal load.  - Continued prescribed antifungal cream for nail treatment.    Z79.84 LONG TERM (CURRENT) USE OF ORAL HYPOGLYCEMIC DRUGS:  - Continued Metformin therapy with refills provided until October 25, 2025.    Z23 ENCOUNTER FOR IMMUNIZATION:  - Reviewed patient's immunization status, including influenza vaccine, herpes zoster vaccine, and pneumococcal vaccine.  - Discussed the importance of RSV vaccination, particularly for protecting vulnerable family members.  - Recommend getting RSV vaccination.  - Noted patient is up to date on most immunizations, including influenza and herpes zoster vaccines.  - Instructed patient to obtain and forward copy of pneumococcal vaccination record from local pharmacy.         Plan:   Type 2 diabetes mellitus without complication, without long-term current use of insulin  -     Hemoglobin A1C; Future; Expected date: 08/12/2025  -     Microalbumin/Creatinine Ratio, Urine; Future; Expected date: 08/12/2025  -     Lipid Panel; Future; Expected date: 08/12/2025    Benign essential HTN  -     Basic Metabolic Panel; Future; Expected date: 08/12/2025  -     Lipid Panel; Future; Expected date: 08/12/2025    Screening for prostate cancer  -     PSA, SCREENING; Future; Expected date: 08/12/2025      . Overall Clemente is doing good.  His A1c level is the best at 5.9.  He is watching his diet also.  Congratulations.  His blood pressures are fairly stable.  He is updated on most of the immunizations including shingles vaccine in 2020.  He did get the pneumonia vaccination and I have  requested him to forward us a copy from his  Local pharmacy.  He is updated on the flu shot also.  We did discuss about the RSV vaccine and he will keep that in consideration  Foot examination done today and he should get a eye checkup also sewn done  GIVEN THE DIAGNOSIS OF DIABETES, STATIN IS A CONSIDERATION BUT WITH HIS SIGNIFICANT WEIGHT LOSS AND COMMITMENTS I WILL HOLD OFF TILL THE NEXT VISIT.  CHECK LIPID PANEL NEXT TIME.  Follow up in about 6 months (around 8/12/2025), or if symptoms worsen or fail to improve, for Diabetes/HTN/Lipids.      Current Outpatient Medications:     blood sugar diagnostic (ONETOUCH ULTRA TEST) Strp, 1 strip by Misc.(Non-Drug; Combo Route) route once daily. Please provide him 1 touch strips which matches with his machine., Disp: 100 strip, Rfl: 3    blood-glucose meter kit, To check BG 1 times daily, to use with insurance preferred meter, Disp: 1 each, Rfl: 0    lancets (ONETOUCH DELICA LANCETS) 33 gauge Misc, 1 lancet  by Misc.(Non-Drug; Combo Route) route once daily., Disp: 100 each, Rfl: 3    lisinopriL 10 MG tablet, Take 1 tablet (10 mg total) by mouth once daily., Disp: 90 tablet, Rfl: 3    metFORMIN (GLUCOPHAGE-XR) 500 MG ER 24hr tablet, Take 1 tablet (500 mg total) by mouth 2 (two) times daily with meals., Disp: 180 tablet, Rfl: 3    multivitamin capsule, Take 1 capsule by mouth once daily., Disp: , Rfl:     timolol maleate 0.5% (TIMOPTIC) 0.5 % Drop, , Disp: , Rfl:     This note was generated with the assistance of ambient listening technology. Verbal consent was obtained by the patient and accompanying visitor(s) for the recording of patient appointment to facilitate this note. I attest to having reviewed and edited the generated note for accuracy, though some syntax or spelling errors may persist. Please contact the author of this note for any clarification.      Celestine Walsh

## 2025-02-17 ENCOUNTER — PATIENT MESSAGE (OUTPATIENT)
Dept: ADMINISTRATIVE | Facility: HOSPITAL | Age: 70
End: 2025-02-17
Payer: COMMERCIAL

## 2025-05-23 ENCOUNTER — PATIENT MESSAGE (OUTPATIENT)
Dept: ADMINISTRATIVE | Facility: HOSPITAL | Age: 70
End: 2025-05-23
Payer: COMMERCIAL

## 2025-05-29 ENCOUNTER — PATIENT MESSAGE (OUTPATIENT)
Dept: ADMINISTRATIVE | Facility: HOSPITAL | Age: 70
End: 2025-05-29
Payer: COMMERCIAL

## 2025-07-29 ENCOUNTER — LAB VISIT (OUTPATIENT)
Dept: LAB | Facility: HOSPITAL | Age: 70
End: 2025-07-29
Attending: INTERNAL MEDICINE
Payer: COMMERCIAL

## 2025-07-29 DIAGNOSIS — Z12.5 SCREENING FOR PROSTATE CANCER: ICD-10-CM

## 2025-07-29 DIAGNOSIS — E11.9 TYPE 2 DIABETES MELLITUS WITHOUT COMPLICATION, WITHOUT LONG-TERM CURRENT USE OF INSULIN: ICD-10-CM

## 2025-07-29 DIAGNOSIS — I10 BENIGN ESSENTIAL HTN: ICD-10-CM

## 2025-07-29 LAB
ALBUMIN/CREAT UR: 4 UG/MG
ANION GAP (OHS): 10 MMOL/L (ref 8–16)
BUN SERPL-MCNC: 32 MG/DL (ref 8–23)
CALCIUM SERPL-MCNC: 9.5 MG/DL (ref 8.7–10.5)
CHLORIDE SERPL-SCNC: 102 MMOL/L (ref 95–110)
CHOLEST SERPL-MCNC: 205 MG/DL (ref 120–199)
CHOLEST/HDLC SERPL: 5.1 {RATIO} (ref 2–5)
CO2 SERPL-SCNC: 25 MMOL/L (ref 23–29)
CREAT SERPL-MCNC: 1 MG/DL (ref 0.5–1.4)
CREAT UR-MCNC: 198 MG/DL (ref 23–375)
EAG (OHS): 128 MG/DL (ref 68–131)
GFR SERPLBLD CREATININE-BSD FMLA CKD-EPI: >60 ML/MIN/1.73/M2
GLUCOSE SERPL-MCNC: 126 MG/DL (ref 70–110)
HBA1C MFR BLD: 6.1 % (ref 4–5.6)
HDLC SERPL-MCNC: 40 MG/DL (ref 40–75)
HDLC SERPL: 19.5 % (ref 20–50)
LDLC SERPL CALC-MCNC: 146.2 MG/DL (ref 63–159)
MICROALBUMIN UR-MCNC: 8 UG/ML (ref ?–5000)
NONHDLC SERPL-MCNC: 165 MG/DL
POTASSIUM SERPL-SCNC: 4.4 MMOL/L (ref 3.5–5.1)
PSA SERPL-MCNC: 1.94 NG/ML
SODIUM SERPL-SCNC: 137 MMOL/L (ref 136–145)
TRIGL SERPL-MCNC: 94 MG/DL (ref 30–150)

## 2025-07-29 PROCEDURE — 36415 COLL VENOUS BLD VENIPUNCTURE: CPT | Mod: PN

## 2025-07-29 PROCEDURE — 84153 ASSAY OF PSA TOTAL: CPT

## 2025-07-29 PROCEDURE — 80061 LIPID PANEL: CPT

## 2025-07-29 PROCEDURE — 83036 HEMOGLOBIN GLYCOSYLATED A1C: CPT

## 2025-07-29 PROCEDURE — 82043 UR ALBUMIN QUANTITATIVE: CPT

## 2025-07-29 PROCEDURE — 80048 BASIC METABOLIC PNL TOTAL CA: CPT

## 2025-08-03 RX ORDER — ROSUVASTATIN CALCIUM 5 MG/1
5 TABLET, COATED ORAL DAILY
Qty: 90 TABLET | Refills: 3 | Status: CANCELLED | OUTPATIENT
Start: 2025-08-03 | End: 2026-08-03

## 2025-08-04 NOTE — PROGRESS NOTES
Subjective:       Patient ID: Pierce Kim is a 69 y.o. male.    Chief Complaint: Diabetes, Hypertension, Hyperlipidemia, and Follow-up    History of Present Illness    CHIEF COMPLAINT:  Pierce presents for a follow-up visit to discuss recent lab results and overall health status.    HPI:  Pierce reports relatively stable blood sugar, typically around 140 in the morning and in the 90s during the day. He checks his blood sugar regularly. He has muscle aches, which he attributes to exercise, and requests his wife to massage his muscles due to this discomfort. He notes a decrease in urinary stream strength compared to his younger years, but denies significant difficulty passing urine. He is evaluated by Dr. Larson in [place] for regular eye check-ups due to his glaucoma diagnosis, with his most recent evaluation a few months ago and another scheduled for November.    He denies chest pain, discomfort, or abdominal pain.    MEDICATIONS:  Pierce is on Lisinopril 10 mg daily for blood pressure control and kidney protection. He is also taking Metformin for diabetes management and Timolol eye drops for glaucoma. He is on a multivitamin.    MEDICAL HISTORY:  Pierce has a history of hypertension, diabetes, and glaucoma. Pierce received a tetanus vaccination in 2017, with the next one due in 2027. He has completed two shingles vaccines.    FAMILY HISTORY:  Family history is significant for father with a history of prostate cancer.    TEST RESULTS:  Pierce's recent A1C was 6.1%, which has increased from his previous best of 5.9%. His recent PSA level was 1.94 ng/mL, showing fluctuations over the last 5 years ranging from 1.0 to 1.94. His recent cholesterol test showed a total of 205 mg/dL (up from 193 last year) and LDL of 146 mg/dL (up from 125). The recent Basic Metabolic Panel revealed Sodium 137 mEq/L, Potassium 4.4 mEq/L, Glucose 126 mg/dL, BUN 32 mg/dL, and Creatinine 1.0 mg/dL. A recent urinalysis was performed to  "check for protein leakage. Pierce is scheduled for a ColoGuard test on January 30, 2024.      ROS:  General: -fever, -chills, -fatigue, -weight gain, -weight loss  Cardiovascular: -chest pain, -palpitations, -lower extremity edema  Respiratory: -cough, -shortness of breath  Gastrointestinal: -abdominal pain, -nausea, -vomiting, -diarrhea, -constipation, -blood in stool  Skin: -rash, -lesion  Neurological: -headache, -dizziness, -numbness, -tingling, +memory problems, +memory loss, +forgetfulness  Male Genitourinary: +post-urination dribbling  Musculoskeletal: +muscle pain, +body aches         Past Medical History:   Diagnosis Date    Hyperlipidemia     Hypertension     Type 2 diabetes mellitus without complication, without long-term current use of insulin 2/12/2025     Social History[1]  Past Surgical History:   Procedure Laterality Date    COLONOSCOPY  2017    EYE SURGERY       Family History   Problem Relation Name Age of Onset    Hypertension Mother Zahida Maldonado     Dementia Mother Zahida Maldonado     Emphysema Father Jorge L     Cancer Father Jorge L         Prostate & Esophageal CA    Hypertension Sister Ruthy         Takes 10 mg of lisinopril.       Objective:      Physical Exam  Blood pressure 121/76, pulse 65, height 5' 11" (1.803 m), weight 108.9 kg (240 lb 1.3 oz). Body mass index is 33.48 kg/m².  Physical Exam    Vitals: Height: 5'11". Weight: 240 lbs. Blood pressure: 121/76.  General: No acute distress. Well-developed. Well-nourished.  BMI is 33.4 a  Eyes: EOMI. Sclerae anicteric.  Cardiovascular: Regular rate. Regular rhythm. No murmurs. No rubs. No gallops. Normal S1, S2.  Respiratory: Normal respiratory effort. Clear to auscultation bilaterally. No rales. No rhonchi. No wheezing.  Abd soft  Musculoskeletal: No  obvious deformity.  Extremities: No lower extremity edema.  Neurological: Alert & oriented to situation and context.  Psychiatric: Normal mood. Normal affect. Good insight. Good judgment.  Skin: Warm. " Dry.              Assessment:       Lab Visit on 07/29/2025   Component Date Value Ref Range Status    Hemoglobin A1c 07/29/2025 6.1 (H)  4.0 - 5.6 % Final    Estimated Average Glucose 07/29/2025 128  68 - 131 mg/dL Final    Urine Microalbumin 07/29/2025 8.0    ug/mL Final    Urine Creatinine 07/29/2025 198.0  23.0 - 375.0 mg/dL Final    Microalbumin/Creatinine Ratio Urine 07/29/2025 4.0  <=30.0 ug/mg Final    Prostate Specific Antigen 07/29/2025 1.94  <=4.00 ng/mL Final    Sodium 07/29/2025 137  136 - 145 mmol/L Final    Potassium 07/29/2025 4.4  3.5 - 5.1 mmol/L Final    Chloride 07/29/2025 102  95 - 110 mmol/L Final    CO2 07/29/2025 25  23 - 29 mmol/L Final    Glucose 07/29/2025 126 (H)  70 - 110 mg/dL Final    BUN 07/29/2025 32 (H)  8 - 23 mg/dL Final    Creatinine 07/29/2025 1.0  0.5 - 1.4 mg/dL Final    Calcium 07/29/2025 9.5  8.7 - 10.5 mg/dL Final    Anion Gap 07/29/2025 10  8 - 16 mmol/L Final    eGFR 07/29/2025 >60  >60 mL/min/1.73/m2 Final    Cholesterol Total 07/29/2025 205 (H)  120 - 199 mg/dL Final    Triglyceride 07/29/2025 94  30 - 150 mg/dL Final    HDL Cholesterol 07/29/2025 40  40 - 75 mg/dL Final    LDL Cholesterol 07/29/2025 146.2  63.0 - 159.0 mg/dL Final    HDL/Cholesterol Ratio 07/29/2025 19.5 (L)  20.0 - 50.0 % Final    Cholesterol/HDL Ratio 07/29/2025 5.1 (H)  2.0 - 5.0 Final    Non HDL Cholesterol 07/29/2025 165  mg/dL Final     Component Ref Range & Units 5 d ago 5 mo ago 1 yr ago 2 yr ago 3 yr ago 6 yr ago   Hemoglobin A1c 4.0 - 5.6 % 6.1 High  5.9 High  CM 7.6 High  R, CM 6.3 High  R, CM 6.1 R, CM 5.3     Component Ref Range & Units 5 d ago 1 yr ago 2 yr ago 3 yr ago 4 yr ago   Cholesterol Total 120 - 199 mg/dL 205 High  193  High    High  CM   Comment: The National Cholesterol Education Program (NCEP) has set the  following guidelines (reference ranges) for Cholesterol:  Optimal.....................<200 mg/dL  Borderline High.............200-239  "mg/dL  High........................> or = 240 mg/dL   Triglyceride 30 - 150 mg/dL 94 139 CM 77 CM 73 CM 64 CM   Comment: The National Cholesterol Education Program (NCEP) has set the  following guidelines (reference values) for triglycerides:  Normal......................<150 mg/dL  Borderline High.............150-199 mg/dL  High........................200-499 mg/dL   HDL Cholesterol 40 - 75 mg/dL 40 40 CM 42 CM 40 CM 45 CM   Comment: The National Cholesterol Education Program (NCEP) has set the  following guidelines (reference values) for HDL Cholesterol:  Low...............<40 mg/dL  Optimal...........>60 mg/dL   LDL Cholesterol 63.0 - 159.0 mg/dL 146.2 125.2 .6 .4 .2 CM                Component Ref Range & Units 5 d ago  (7/29/25) 5 mo ago  (2/6/25) 1 yr ago  (7/18/24) 1 yr ago  (1/17/24) 2 yr ago  (3/7/23) 3 yr ago  (6/2/22) 3 yr ago  (9/22/21)   Sodium 136 - 145 mmol/L 137 137 136 137 138 135 Low  140   Potassium 3.5 - 5.1 mmol/L 4.4 4.9 4.3 4.9 4.4 4.4 3.8   Chloride 95 - 110 mmol/L 102 102 101 102 102 101 105   CO2 23 - 29 mmol/L 25 26 26 27 27 24 26   Glucose 70 - 110 mg/dL 126 High  115 High  201 High  187 High  147 High  177 High  117 High    BUN 8 - 23 mg/dL 32 High  26 High  27 High  26 High  29 High  32 High  26 High    Creatinine 0.5 - 1.4 mg/dL 1.0 1.0 0.9 1.0 1.0 1.0 1.0   Calcium 8.7 - 10.5 mg/dL 9.5 9.8 9.2 9.5 9.3 9.1 8.9        Component Ref Range & Units 5 d ago 1 yr ago 2 yr ago 3 yr ago 5 yr ago   Prostate Specific Antigen <=4.00 ng/mL 1.94 1.56 R, CM 1.8 R, CM 1.6 R 1.9      Assessment & Plan    E11.9 TYPE 2 DIABETES MELLITUS WITHOUT COMPLICATION, WITHOUT LONG-TERM CURRENT USE OF INSULIN:  - Reviewed A1C, noting increase to 6.1 from previous best of 5.9.  - Explained A1C test provides an average of glucose levels over the past 3 months.  - Pierce to continue monitoring glucose levels at home and continue Metformin.  - Discussed cholesterol management as the "third piece of " "the puzzle" in diabetes care, alongside glucose and BP control.  - Ordered A1C test in 4 months.  - Complete fasting lab work a few days to weeks before next appointment.    I10 BENIGN ESSENTIAL HTN:  - BP well-controlled on lisinopril 10 mg, which will be continued.    E78.2 MIXED DYSLIPIDEMIA:  - Assessed cholesterol levels, noting increase in total cholesterol to 205 and LDL to 146.  - Considered starting cholesterol medication due to rising levels, but agreed to reassess in 4 months.  - Outlined potential side effects of cholesterol medication: liver damage, muscle aches, memory issues, and potential slight increase in blood sugar.  - Recommend working on lowering cholesterol through lifestyle modifications.  - Ordered fasting lipid panel in 4 months.         Plan:   Benign essential HTN  Comments:  Blood pressures are under control as he continues on lisinopril which affords renal protection also.    Type 2 diabetes mellitus without complication, without long-term current use of insulin  Comments:  Slight rise in A1c level has been noted as he continues on metformin  b.i.d. and he promises to bring it down next visit  Orders:  -     Hemoglobin A1C; Future; Expected date: 11/05/2025  -     metFORMIN (GLUCOPHAGE-XR) 500 MG ER 24hr tablet; Take 1 tablet (500 mg total) by mouth 2 (two) times daily with meals.  Dispense: 180 tablet; Refill: 3    Mixed dyslipidemia  Comments:  Mildly elevated cholesterol noted and he defers any statin medications at this point.  Orders:  -     Lipid Panel; Future; Expected date: 11/05/2025          Patient's control of diabetes has been noted with slight rise in A1c level.  Slight rise in lipid panel also has been noted.  Discussed about statins and recommend 5 mg of rosuvastatin at least 3 times a week.  At this point he defers.  He will think about it next visit.  He wanted to know the side effects which has been explained to him including potential liver problems, myopathy " symptoms, some reports of elevated blood sugars which he already has and some anecdotal reports of memory issues.  Referral to optometry/ophthalmology-he already had a eye examination and will request reports  He did have a negative Cologuard in 2024 and I am not sure epic is requesting another colorectal screening so soon  Follow up in about 4 months (around 12/5/2025), or if symptoms worsen or fail to improve, for Diabetes/HTN/Lipids.    Current Medications[2]    This note was generated with the assistance of ambient listening technology. Verbal consent was obtained by the patient and accompanying visitor(s) for the recording of patient appointment to facilitate this note. I attest to having reviewed and edited the generated note for accuracy, though some syntax or spelling errors may persist. Please contact the author of this note for any clarification.      Celestine Walsh           [1]   Social History  Socioeconomic History    Marital status:      Spouse name: Zoila    Number of children: 3   Occupational History    Occupation: Retired      Comment: [employer]   Tobacco Use    Smoking status: Never    Smokeless tobacco: Never   Substance and Sexual Activity    Alcohol use: Yes     Comment: occasional    Drug use: No    Sexual activity: Yes     Partners: Female   Social History Narrative    2 G Lori 40 , radha 28     1 B TJ 36        GC 3     Social Drivers of Health     Financial Resource Strain: Low Risk  (8/4/2025)    Overall Financial Resource Strain (CARDIA)     Difficulty of Paying Living Expenses: Not hard at all   Food Insecurity: No Food Insecurity (8/4/2025)    Hunger Vital Sign     Worried About Running Out of Food in the Last Year: Never true     Ran Out of Food in the Last Year: Never true   Transportation Needs: No Transportation Needs (8/4/2025)    PRAPARE - Transportation     Lack of Transportation (Medical): No     Lack of Transportation (Non-Medical): No   Physical  Activity: Sufficiently Active (8/4/2025)    Exercise Vital Sign     Days of Exercise per Week: 6 days     Minutes of Exercise per Session: 40 min   Stress: No Stress Concern Present (8/4/2025)    [employer] - Occupational Stress Questionnaire     Feeling of Stress : Not at all   Housing Stability: Low Risk  (8/4/2025)    Housing Stability Vital Sign     Unable to Pay for Housing in the Last Year: No     Number of Times Moved in the Last Year: 0     Homeless in the Last Year: No   [2]   Current Outpatient Medications:     blood sugar diagnostic (ONETOUCH ULTRA TEST) Strp, 1 strip by Misc.(Non-Drug; Combo Route) route once daily. Please provide him 1 touch strips which matches with his machine., Disp: 100 strip, Rfl: 3    lancets (ONETOUCH DELICA LANCETS) 33 gauge Misc, 1 lancet  by Misc.(Non-Drug; Combo Route) route once daily., Disp: 100 each, Rfl: 3    lisinopriL 10 MG tablet, TAKE 1 TABLET(10 MG) BY MOUTH DAILY, Disp: 90 tablet, Rfl: 1    multivitamin capsule, Take 1 capsule by mouth once daily., Disp: , Rfl:     timolol maleate 0.5% (TIMOPTIC) 0.5 % Drop, , Disp: , Rfl:     blood-glucose meter kit, To check BG 1 times daily, to use with insurance preferred meter, Disp: 1 each, Rfl: 0    metFORMIN (GLUCOPHAGE-XR) 500 MG ER 24hr tablet, Take 1 tablet (500 mg total) by mouth 2 (two) times daily with meals., Disp: 180 tablet, Rfl: 3

## 2025-08-05 ENCOUNTER — OFFICE VISIT (OUTPATIENT)
Dept: FAMILY MEDICINE | Facility: CLINIC | Age: 70
End: 2025-08-05
Payer: COMMERCIAL

## 2025-08-05 ENCOUNTER — PATIENT MESSAGE (OUTPATIENT)
Dept: FAMILY MEDICINE | Facility: CLINIC | Age: 70
End: 2025-08-05

## 2025-08-05 VITALS
SYSTOLIC BLOOD PRESSURE: 121 MMHG | HEART RATE: 65 BPM | DIASTOLIC BLOOD PRESSURE: 76 MMHG | BODY MASS INDEX: 33.61 KG/M2 | HEIGHT: 71 IN | WEIGHT: 240.06 LBS

## 2025-08-05 DIAGNOSIS — E11.9 TYPE 2 DIABETES MELLITUS WITHOUT COMPLICATION, WITHOUT LONG-TERM CURRENT USE OF INSULIN: ICD-10-CM

## 2025-08-05 DIAGNOSIS — E78.2 MIXED DYSLIPIDEMIA: ICD-10-CM

## 2025-08-05 DIAGNOSIS — I10 BENIGN ESSENTIAL HTN: Primary | ICD-10-CM

## 2025-08-05 PROCEDURE — 3066F NEPHROPATHY DOC TX: CPT | Mod: CPTII,S$GLB,, | Performed by: INTERNAL MEDICINE

## 2025-08-05 PROCEDURE — 3288F FALL RISK ASSESSMENT DOCD: CPT | Mod: CPTII,S$GLB,, | Performed by: INTERNAL MEDICINE

## 2025-08-05 PROCEDURE — 3008F BODY MASS INDEX DOCD: CPT | Mod: CPTII,S$GLB,, | Performed by: INTERNAL MEDICINE

## 2025-08-05 PROCEDURE — 3044F HG A1C LEVEL LT 7.0%: CPT | Mod: CPTII,S$GLB,, | Performed by: INTERNAL MEDICINE

## 2025-08-05 PROCEDURE — 4010F ACE/ARB THERAPY RXD/TAKEN: CPT | Mod: CPTII,S$GLB,, | Performed by: INTERNAL MEDICINE

## 2025-08-05 PROCEDURE — 99999 PR PBB SHADOW E&M-EST. PATIENT-LVL IV: CPT | Mod: PBBFAC,,, | Performed by: INTERNAL MEDICINE

## 2025-08-05 PROCEDURE — 3074F SYST BP LT 130 MM HG: CPT | Mod: CPTII,S$GLB,, | Performed by: INTERNAL MEDICINE

## 2025-08-05 PROCEDURE — 1159F MED LIST DOCD IN RCRD: CPT | Mod: CPTII,S$GLB,, | Performed by: INTERNAL MEDICINE

## 2025-08-05 PROCEDURE — 3061F NEG MICROALBUMINURIA REV: CPT | Mod: CPTII,S$GLB,, | Performed by: INTERNAL MEDICINE

## 2025-08-05 PROCEDURE — 3078F DIAST BP <80 MM HG: CPT | Mod: CPTII,S$GLB,, | Performed by: INTERNAL MEDICINE

## 2025-08-05 PROCEDURE — 1160F RVW MEDS BY RX/DR IN RCRD: CPT | Mod: CPTII,S$GLB,, | Performed by: INTERNAL MEDICINE

## 2025-08-05 PROCEDURE — 1101F PT FALLS ASSESS-DOCD LE1/YR: CPT | Mod: CPTII,S$GLB,, | Performed by: INTERNAL MEDICINE

## 2025-08-05 PROCEDURE — 99213 OFFICE O/P EST LOW 20 MIN: CPT | Mod: S$GLB,,, | Performed by: INTERNAL MEDICINE

## 2025-08-05 PROCEDURE — 1170F FXNL STATUS ASSESSED: CPT | Mod: CPTII,S$GLB,, | Performed by: INTERNAL MEDICINE

## 2025-08-05 RX ORDER — METFORMIN HYDROCHLORIDE 500 MG/1
500 TABLET, EXTENDED RELEASE ORAL 2 TIMES DAILY WITH MEALS
Qty: 180 TABLET | Refills: 3 | Status: SHIPPED | OUTPATIENT
Start: 2025-08-05 | End: 2026-07-31

## 2025-08-05 NOTE — PATIENT INSTRUCTIONS
Rob Pelayo,     If you are due for any health screening(s) below please notify me so we can arrange them to be ordered and scheduled. Most healthy patients at your age complete them, but you are free to accept or refuse.     If you can't do it, I'll definitely understand. If you can, I'd certainly appreciate it!    Tests to Keep You Healthy    Eye Exam: DUE  Colon Cancer Screening: DUE  Last Blood Pressure <= 139/89 (8/5/2025): Yes  Last HbA1c < 8 (07/29/2025): Yes      Its time for your colon cancer screening     Colorectal cancer is one of the leading causes of cancer death for men and women but it doesnt have to be. Screenings can prevent colorectal cancer or find it early enough to treat and cure the disease.     Our records indicate that you may be overdue for colon cancer screening. A colonoscopy or stool screening test can help identify patients at risk for developing colon cancer. Cancer screenings save lives, so schedule yours today to stay healthy.     A colonoscopy is the preferred test for detecting colon cancer. It is needed only once every 10 years if results are negative. While you are sedated, a flexible, lighted tube with a tiny camera is inserted into the rectum and advanced through the colon to look for cancers.     An alternative screening test that is used at home and returned to the lab may also be used. It detects hidden blood in bowel movements which could indicate cancer in the colon. If results are positive, you will need a colonoscopy to determine if the blood is a sign of cancer. This type of follow up (diagnostic) colonoscopy usually requires additional copays as required by your insurance provider.     If you recently had your colon cancer screening performed outside of [facility], please let your Health care team know so that they can update your health record. Please contact your PCP if you have any questions.    Your diabetic retinal eye exam is due     Diabetes is the #1  cause of blindness in the [place] - early detection before signs or symptoms develop can prevent debilitating blindness.     Our records indicate that you may be overdue for your annual diabetic eye exam. Eye screening can help identify patients at risk for developing vision loss which is common in diabetes. This simple screening is an important step to keeping you healthy and preventing complications from diabetes.     This recommended diabetic eye exam should take place once per year and can prevent and treat diabetes complications in the eye before developing symptoms. This can be done with a special camera is used to take photographs of the back of your eye without having to dilate them, or you can see an eye doctor for a full dilated exam.     If you recently had your yearly diabetic eye exam performed outside of [facility], please let your Health care team know so that they can update your health record.

## 2025-08-06 ENCOUNTER — PATIENT OUTREACH (OUTPATIENT)
Dept: ADMINISTRATIVE | Facility: HOSPITAL | Age: 70
End: 2025-08-06
Payer: COMMERCIAL

## 2025-08-06 NOTE — PROGRESS NOTES
Population Health Chart Review & Patient Outreach Details      Additional [facility] Health Notes:               Updates Requested / Reviewed:      Updated Care Coordination Note, Care Everywhere, , and Immunizations Reconciliation Completed or Queried: [facility] Topics Overdue:      [facility] Score: 2     Colon Cancer Screening  Eye Exam    Pneumonia Vaccine  RSV Vaccine                  Health Maintenance Topic(s) Outreach Outcomes & Actions Taken:    Eye Exam - Outreach Outcomes & Actions Taken  : External Records Requested & Care Team Updated if Applicable

## 2025-08-06 NOTE — LETTER
AUTHORIZATION FOR RELEASE OF   CONFIDENTIAL INFORMATION    Dear  Dr. Bill Pruitt,     We are seeing Pierce Kim, date of birth 1955, in the clinic at [facility]. Celestine Walsh MD is the patient's PCP. Pierce Kim has an outstanding lab/procedure at the time we reviewed his chart. In order to help keep his health information updated, he has authorized us to request the following medical record(s):          ( x )  EYE EXAM            Please fax records to [facility], Raina, Sanjay, MD, [phone]     If you have any questions, please contact       Hilda Irizarry  Nurse Clinical Care Coordinator  [facility]  Phone: [phone]  Fax: [phone]      Patient Name: Pierce Kim  : 1955  Patient Phone #: [phone]                Pierce Kim  MRN: [id number]  : 1955  Age: 69 y.o.  Sex: male         Patient/Legal Guardian Signature  This signature was collected at 2025           _______________________________   Printed Name/Relationship to Patient      Consent for Examination and Treatment: I hereby authorize the providers and employees of [facility] ([facility]) to provide medical treatment/services which includes, but is not limited to, performing and administering tests and diagnostic procedures that are deemed necessary, including, but not limited to, imaging examinations, blood tests and other laboratory procedures as may be required by the hospital, clinic, or may be ordered by my physician(s) or persons working under the general and/or special instructions of my physician(s).      I understand and agree that this consent covers all authorized persons, including but not limited to physicians, residents, nurse practitioners, physicians' assistants, specialists, consultants, student nurses, and independently contracted physicians, who are called upon by the physician in charge, to carry  out the diagnostic procedures and medical or surgical treatment.     I hereby authorize [facility] to retain or dispose of any specimens or tissue, should there be such remaining from any test or procedure.     I hereby authorize and give consent for [facility] providers and employees to take photographs, images or videotapes of such diagnostic, surgical or treatment procedures of Patient as may be required by [facility] or as may be ordered by a physician. I further acknowledge and agree that [facility] may use cameras or other devices for patient monitoring.     I am aware that the practice of medicine is not an exact science, and I acknowledge that no guarantees have been made to me as to the outcome of any tests, procedures or treatment.     Authorization for Release of Information: I understand that my insurance company and/or their agents may need information necessary to make determinations about payment/reimbursement. I hereby provide authorization to release to all insurance companies, their successors, assignees, other parties with whom they may have contracted, or others acting on their behalf, that are involved with payment for any hospital and/or clinic charges incurred by the patient, any information that they request and deem necessary for payment/reimbursement, and/or quality review.  I further authorize the release of my health information to physicians or other health care practitioners on staff who are involved in my health care now and in the future, and to other health care providers, entities, or institutions for the purpose of my continued care and treatment, including referrals.     REGISTRATION AUTHORIZATION  Form No. 66599 (Rev. 3/25/2024)    Page 1 of 3                       Medicare Patient's Certification and Authorization to Release Information and Payment Request:  I certify that the information given by me in applying for payment under Title XVIII of the Social Security Act is correct. I  authorize any mccormick of medical or other information about me to release to the [facility], or its intermediaries or carriers, any information needed for this or a related Medicare claim. I request that payment of authorized benefits be made on my behalf.     Assignment of Insurance Benefits:   I hereby authorize any and all insurance companies, health plans, defined   benefit plans, health insurers or any entity that is or may be responsible for payment of my medical expenses to pay all hospital and medical benefits now due, and to become due and payable to me under any hospital benefits, sick benefits, injury benefits or any other benefit for services rendered to me, including Major Medical Benefits, direct to [facility] and all independently contracted physicians. I assign any and all rights that I may have against any and all insurance companies, health plans, defined benefit plans, health insurers or any entity that is or may be responsible for payment of my medical expenses, including, but not limited to any right to appeal a denial of a claim, any right to bring any action, lawsuit, administrative proceeding, or other cause of action on my behalf. I specifically assign my right to pursue litigation against any and all insurance companies, health plans, defined benefit plans, health insurers or any entity that is or may be responsible for payment of my medical expenses based upon a refusal to pay charges.            E. Valuables: It is understood and agreed that [facility] is not liable for the damage to or loss of any money, jewelry,   documents, dentures, eye glasses, hearing aids, prosthetics, or other property of value.     F. Computer Equipment: I understand and agree that should I choose to use computer equipment owned by [facility] or if I choose to access the Internet via [facility]s network, I do so at my own risk. [facility] is not responsible for any damage to my computer equipment or to any  damages of any type that might arise from my loss of equipment or data.     G. Acceptance of Financial Responsibility:  I agree that in consideration of the services and   supplies that have been   or will be furnished to the patient, I am hereby obligated to pay all charges made for or on the account of the patient according to the standard rates (in effect at the time the services and supplies are delivered) established by [facility], including its Patient Financial Assistance Policy to the extent it is applicable. I understand that I am responsible for all charges, or portions thereof, not covered by insurance or other sources. Patient refunds will be distributed only after balances at all [facility] facilities are paid.     H. Communication Authorization:  I hereby authorize [facility] and its representatives, along with any billing service   or  who may work on their behalf, to contact me on   my cell phone and/or home phone using pre- recorded messages, artificial voice messages, automatic telephone dialing devices or other computer assisted technology, or by electronic      mail, text messaging, or by any other form of electronic communication. This includes, but is not limited to, appointment reminders, yearly physical exam reminders, preventive care reminders, patient campaigns, welcome calls, and calls about account balances on my account or any account on which I am listed as a guarantor. I understand I have the right to opt out of these communications at any time.      Relationship  Between  Facility and  Provider:      I understand that some, but not all, providers furnishing services to the patient are not employees or agents of [facility]. The patient is under the care and supervision of his/her attending physician, and it is the responsibility of the facility and its nursing staff to carry out the instructions of such physicians. It is the responsibility of the patient's physician/designee to  obtain the patient's informed consent, when required, for medical or surgical treatment, special diagnostic or therapeutic procedures, or hospital services rendered for the patient under the special instructions of the physician/designee.           REGISTRATION AUTHORIZATION  Form No. 90275 (Rev. 3/25/2024)    Page 2 of 3                       Immunizations: [facility] shares immunization information with state sponsored health departments to help you and your doctor keep track of your immunization records. By signing, you consent to have this information shared with the health department in your state:                                [place] - LINKS ([place] Immunization Network for Kids Statewide)                                [place] - MIIX ([place] Immunization Information eXchange)                                [place] - ImmPRINT (Immunization Patient Registry with Integrated Technology)     TERM: This authorization is valid for this and subsequent care/treatment I receive at [facility] and will remain valid unless/until revoked in writing by me.     [facility]: As used in this document, [facility] means all [facility] owned and managed facilities, including, but not limited to, all health centers, surgery centers, clinics, urgent care centers, and hospitals.         [facility] complies with applicable Federal civil rights laws and does not discriminate on the basis of race, color, national origin, age, disability, or sex.  ATENCIÓN: si habla elayne, tiene a crews disposición servicios gratuitos de asistencia lingüística. Anali estes [phone].  CHÚ Ý: N?u b?n nói Ti?ng Vi?t, có các d?ch v? h? tr? ngôn ng? mi?n phí dành cho b?n. G?i s? [phone].        REGISTRATION AUTHORIZATION  Form No. 23896 (Rev. 3/25/2024)   Page 3 of 3     Patient

## 2025-08-28 DIAGNOSIS — E11.9 TYPE 2 DIABETES MELLITUS WITHOUT COMPLICATION, WITHOUT LONG-TERM CURRENT USE OF INSULIN: ICD-10-CM

## 2025-08-28 RX ORDER — LANCETS 33 GAUGE
EACH MISCELLANEOUS
Qty: 100 EACH | Refills: 3 | Status: SHIPPED | OUTPATIENT
Start: 2025-08-28